# Patient Record
Sex: MALE | Race: WHITE | Employment: FULL TIME | URBAN - METROPOLITAN AREA
[De-identification: names, ages, dates, MRNs, and addresses within clinical notes are randomized per-mention and may not be internally consistent; named-entity substitution may affect disease eponyms.]

---

## 2023-08-04 ENCOUNTER — APPOINTMENT (OUTPATIENT)
Dept: GENERAL RADIOLOGY | Age: 58
End: 2023-08-04

## 2023-08-04 ENCOUNTER — APPOINTMENT (OUTPATIENT)
Dept: GENERAL RADIOLOGY | Age: 58
DRG: 199 | End: 2023-08-04

## 2023-08-04 ENCOUNTER — HOSPITAL ENCOUNTER (EMERGENCY)
Age: 58
Discharge: ANOTHER HOSPITAL | End: 2023-08-04
Attending: EMERGENCY MEDICINE

## 2023-08-04 ENCOUNTER — HOSPITAL ENCOUNTER (INPATIENT)
Age: 58
LOS: 2 days | Discharge: HOME | DRG: 199 | End: 2023-08-06

## 2023-08-04 ENCOUNTER — APPOINTMENT (OUTPATIENT)
Dept: CT IMAGING | Age: 58
End: 2023-08-04

## 2023-08-04 VITALS
BODY MASS INDEX: 18.19 KG/M2 | TEMPERATURE: 97.9 F | HEIGHT: 68 IN | DIASTOLIC BLOOD PRESSURE: 112 MMHG | RESPIRATION RATE: 16 BRPM | WEIGHT: 120 LBS | HEART RATE: 88 BPM | SYSTOLIC BLOOD PRESSURE: 168 MMHG | OXYGEN SATURATION: 97 %

## 2023-08-04 DIAGNOSIS — F10.10 ALCOHOL ABUSE: Chronic | ICD-10-CM

## 2023-08-04 DIAGNOSIS — S27.0XXA TRAUMATIC PNEUMOTHORAX, INITIAL ENCOUNTER: ICD-10-CM

## 2023-08-04 DIAGNOSIS — S22.41XA CLOSED TRAUMATIC FRACTURE OF RIBS OF RIGHT SIDE WITH PNEUMOTHORAX: ICD-10-CM

## 2023-08-04 DIAGNOSIS — J18.9 COMMUNITY ACQUIRED PNEUMONIA OF RIGHT MIDDLE LOBE OF LUNG: ICD-10-CM

## 2023-08-04 DIAGNOSIS — J43.9 PULMONARY EMPHYSEMA, UNSPECIFIED EMPHYSEMA TYPE (HCC): Primary | Chronic | ICD-10-CM

## 2023-08-04 DIAGNOSIS — J96.01 ACUTE RESPIRATORY FAILURE WITH HYPOXEMIA (HCC): Primary | ICD-10-CM

## 2023-08-04 DIAGNOSIS — S27.0XXA CLOSED TRAUMATIC FRACTURE OF RIBS OF RIGHT SIDE WITH PNEUMOTHORAX: ICD-10-CM

## 2023-08-04 PROBLEM — J44.9 COPD (CHRONIC OBSTRUCTIVE PULMONARY DISEASE) (HCC): Status: ACTIVE | Noted: 2023-08-04

## 2023-08-04 PROBLEM — F17.200 SMOKER: Status: ACTIVE | Noted: 2023-08-04

## 2023-08-04 LAB
ALBUMIN SERPL-MCNC: 4.6 G/DL (ref 3.5–5)
ALBUMIN/GLOB SERPL: 1.6 (ref 0.4–1.6)
ALP SERPL-CCNC: 65 U/L (ref 45–117)
ALT SERPL-CCNC: 16 U/L (ref 13–61)
ANION GAP SERPL CALC-SCNC: 11 MMOL/L (ref 2–11)
AST SERPL-CCNC: 23 U/L (ref 15–37)
BASOPHILS # BLD: 0 K/UL (ref 0–0.2)
BASOPHILS NFR BLD: 0 % (ref 0–2)
BILIRUB SERPL-MCNC: 0.2 MG/DL (ref 0.2–1.1)
BUN SERPL-MCNC: 8 MG/DL (ref 6–23)
CALCIUM SERPL-MCNC: 8.9 MG/DL (ref 8.3–10.4)
CHLORIDE SERPL-SCNC: 96 MMOL/L (ref 98–107)
CO2 SERPL-SCNC: 27 MMOL/L (ref 21–32)
CREAT SERPL-MCNC: 0.57 MG/DL (ref 0.8–1.5)
DIFFERENTIAL METHOD BLD: ABNORMAL
EKG ATRIAL RATE: 72 BPM
EKG DIAGNOSIS: NORMAL
EKG P AXIS: 91 DEGREES
EKG P-R INTERVAL: 128 MS
EKG Q-T INTERVAL: 407 MS
EKG QRS DURATION: 87 MS
EKG QTC CALCULATION (BAZETT): 449 MS
EKG R AXIS: 89 DEGREES
EKG T AXIS: 82 DEGREES
EKG VENTRICULAR RATE: 73 BPM
EOSINOPHIL # BLD: 0 K/UL (ref 0–0.8)
EOSINOPHIL NFR BLD: 0 % (ref 0.5–7.8)
ERYTHROCYTE [DISTWIDTH] IN BLOOD BY AUTOMATED COUNT: 12.1 % (ref 11.9–14.6)
GLOBULIN SER CALC-MCNC: 2.8 G/DL (ref 2.8–4.5)
GLUCOSE SERPL-MCNC: 130 MG/DL (ref 65–100)
HCT VFR BLD AUTO: 46.3 % (ref 41.1–50.3)
HGB BLD-MCNC: 16.2 G/DL (ref 13.6–17.2)
IMM GRANULOCYTES # BLD AUTO: 0.1 K/UL (ref 0–0.5)
IMM GRANULOCYTES NFR BLD AUTO: 0 % (ref 0–5)
LYMPHOCYTES # BLD: 1.3 K/UL (ref 0.5–4.6)
LYMPHOCYTES NFR BLD: 9 % (ref 13–44)
MAGNESIUM SERPL-MCNC: 1.7 MG/DL (ref 1.2–2.6)
MAGNESIUM SERPL-MCNC: 2 MG/DL (ref 1.8–2.4)
MCH RBC QN AUTO: 33.6 PG (ref 26.1–32.9)
MCHC RBC AUTO-ENTMCNC: 35 G/DL (ref 31.4–35)
MCV RBC AUTO: 96.1 FL (ref 82–102)
MONOCYTES # BLD: 0.6 K/UL (ref 0.1–1.3)
MONOCYTES NFR BLD: 4 % (ref 4–12)
NEUTS SEG # BLD: 13.8 K/UL (ref 1.7–8.2)
NEUTS SEG NFR BLD: 87 % (ref 43–78)
NRBC # BLD: 0 K/UL (ref 0–0.2)
NT PRO BNP: 151 PG/ML (ref 0–450)
PLATELET # BLD AUTO: 369 K/UL (ref 150–450)
PMV BLD AUTO: 8 FL (ref 9.4–12.3)
POTASSIUM SERPL-SCNC: 4.6 MMOL/L (ref 3.5–5.1)
PROT SERPL-MCNC: 7.4 G/DL (ref 6.4–8.2)
RBC # BLD AUTO: 4.82 M/UL (ref 4.23–5.6)
SODIUM SERPL-SCNC: 134 MMOL/L (ref 133–143)
TROPONIN T SERPL HS-MCNC: <6 NG/L (ref 0–22)
WBC # BLD AUTO: 15.8 K/UL (ref 4.3–11.1)

## 2023-08-04 PROCEDURE — 2580000003 HC RX 258

## 2023-08-04 PROCEDURE — 6360000002 HC RX W HCPCS

## 2023-08-04 PROCEDURE — 96375 TX/PRO/DX INJ NEW DRUG ADDON: CPT

## 2023-08-04 PROCEDURE — 71260 CT THORAX DX C+: CPT

## 2023-08-04 PROCEDURE — 6360000002 HC RX W HCPCS: Performed by: NURSE PRACTITIONER

## 2023-08-04 PROCEDURE — 99223 1ST HOSP IP/OBS HIGH 75: CPT | Performed by: INTERNAL MEDICINE

## 2023-08-04 PROCEDURE — 2700000000 HC OXYGEN THERAPY PER DAY

## 2023-08-04 PROCEDURE — 1100000000 HC RM PRIVATE

## 2023-08-04 PROCEDURE — 6360000002 HC RX W HCPCS: Performed by: EMERGENCY MEDICINE

## 2023-08-04 PROCEDURE — 94761 N-INVAS EAR/PLS OXIMETRY MLT: CPT

## 2023-08-04 PROCEDURE — 6370000000 HC RX 637 (ALT 250 FOR IP): Performed by: HOSPITALIST

## 2023-08-04 PROCEDURE — 94640 AIRWAY INHALATION TREATMENT: CPT

## 2023-08-04 PROCEDURE — 36415 COLL VENOUS BLD VENIPUNCTURE: CPT

## 2023-08-04 PROCEDURE — 80053 COMPREHEN METABOLIC PANEL: CPT

## 2023-08-04 PROCEDURE — 85025 COMPLETE CBC W/AUTO DIFF WBC: CPT

## 2023-08-04 PROCEDURE — 83880 ASSAY OF NATRIURETIC PEPTIDE: CPT

## 2023-08-04 PROCEDURE — 6370000000 HC RX 637 (ALT 250 FOR IP)

## 2023-08-04 PROCEDURE — 6370000000 HC RX 637 (ALT 250 FOR IP): Performed by: INTERNAL MEDICINE

## 2023-08-04 PROCEDURE — 96374 THER/PROPH/DIAG INJ IV PUSH: CPT

## 2023-08-04 PROCEDURE — 71045 X-RAY EXAM CHEST 1 VIEW: CPT

## 2023-08-04 PROCEDURE — 83735 ASSAY OF MAGNESIUM: CPT

## 2023-08-04 PROCEDURE — 6370000000 HC RX 637 (ALT 250 FOR IP): Performed by: EMERGENCY MEDICINE

## 2023-08-04 PROCEDURE — 99285 EMERGENCY DEPT VISIT HI MDM: CPT

## 2023-08-04 PROCEDURE — 93005 ELECTROCARDIOGRAM TRACING: CPT | Performed by: EMERGENCY MEDICINE

## 2023-08-04 PROCEDURE — 93010 ELECTROCARDIOGRAM REPORT: CPT | Performed by: INTERNAL MEDICINE

## 2023-08-04 PROCEDURE — 99223 1ST HOSP IP/OBS HIGH 75: CPT

## 2023-08-04 PROCEDURE — 6360000002 HC RX W HCPCS: Performed by: HOSPITALIST

## 2023-08-04 PROCEDURE — 2580000003 HC RX 258: Performed by: NURSE PRACTITIONER

## 2023-08-04 PROCEDURE — 6360000004 HC RX CONTRAST MEDICATION: Performed by: EMERGENCY MEDICINE

## 2023-08-04 PROCEDURE — 87040 BLOOD CULTURE FOR BACTERIA: CPT

## 2023-08-04 PROCEDURE — 84484 ASSAY OF TROPONIN QUANT: CPT

## 2023-08-04 PROCEDURE — 96365 THER/PROPH/DIAG IV INF INIT: CPT

## 2023-08-04 PROCEDURE — 2580000003 HC RX 258: Performed by: EMERGENCY MEDICINE

## 2023-08-04 RX ORDER — AMLODIPINE BESYLATE 5 MG/1
5 TABLET ORAL DAILY
Status: DISCONTINUED | OUTPATIENT
Start: 2023-08-04 | End: 2023-08-04

## 2023-08-04 RX ORDER — SODIUM CHLORIDE 0.9 % (FLUSH) 0.9 %
10 SYRINGE (ML) INJECTION
Status: DISCONTINUED | OUTPATIENT
Start: 2023-08-04 | End: 2023-08-04 | Stop reason: HOSPADM

## 2023-08-04 RX ORDER — ONDANSETRON 2 MG/ML
4 INJECTION INTRAMUSCULAR; INTRAVENOUS
Status: COMPLETED | OUTPATIENT
Start: 2023-08-04 | End: 2023-08-04

## 2023-08-04 RX ORDER — MORPHINE SULFATE 4 MG/ML
4 INJECTION, SOLUTION INTRAMUSCULAR; INTRAVENOUS
Status: COMPLETED | OUTPATIENT
Start: 2023-08-04 | End: 2023-08-04

## 2023-08-04 RX ORDER — ALBUTEROL SULFATE 2.5 MG/3ML
2.5 SOLUTION RESPIRATORY (INHALATION)
Status: DISCONTINUED | OUTPATIENT
Start: 2023-08-04 | End: 2023-08-06 | Stop reason: HOSPADM

## 2023-08-04 RX ORDER — LORAZEPAM 2 MG/ML
2 INJECTION INTRAMUSCULAR
Status: DISCONTINUED | OUTPATIENT
Start: 2023-08-04 | End: 2023-08-06 | Stop reason: HOSPADM

## 2023-08-04 RX ORDER — MORPHINE SULFATE 2 MG/ML
2 INJECTION, SOLUTION INTRAMUSCULAR; INTRAVENOUS EVERY 6 HOURS PRN
Status: DISCONTINUED | OUTPATIENT
Start: 2023-08-04 | End: 2023-08-04

## 2023-08-04 RX ORDER — ENOXAPARIN SODIUM 100 MG/ML
40 INJECTION SUBCUTANEOUS DAILY
Status: DISCONTINUED | OUTPATIENT
Start: 2023-08-04 | End: 2023-08-05

## 2023-08-04 RX ORDER — ONDANSETRON 2 MG/ML
4 INJECTION INTRAMUSCULAR; INTRAVENOUS EVERY 6 HOURS PRN
Status: DISCONTINUED | OUTPATIENT
Start: 2023-08-04 | End: 2023-08-06 | Stop reason: HOSPADM

## 2023-08-04 RX ORDER — LORAZEPAM 1 MG/1
2 TABLET ORAL
Status: DISCONTINUED | OUTPATIENT
Start: 2023-08-04 | End: 2023-08-06 | Stop reason: HOSPADM

## 2023-08-04 RX ORDER — SODIUM CHLORIDE 0.9 % (FLUSH) 0.9 %
5-40 SYRINGE (ML) INJECTION EVERY 12 HOURS SCHEDULED
Status: DISCONTINUED | OUTPATIENT
Start: 2023-08-04 | End: 2023-08-06 | Stop reason: HOSPADM

## 2023-08-04 RX ORDER — LIDOCAINE 4 G/G
1 PATCH TOPICAL DAILY
Status: DISCONTINUED | OUTPATIENT
Start: 2023-08-04 | End: 2023-08-06 | Stop reason: HOSPADM

## 2023-08-04 RX ORDER — 0.9 % SODIUM CHLORIDE 0.9 %
100 INTRAVENOUS SOLUTION INTRAVENOUS
Status: DISCONTINUED | OUTPATIENT
Start: 2023-08-04 | End: 2023-08-04 | Stop reason: HOSPADM

## 2023-08-04 RX ORDER — MORPHINE SULFATE 2 MG/ML
2 INJECTION, SOLUTION INTRAMUSCULAR; INTRAVENOUS EVERY 4 HOURS PRN
Status: DISCONTINUED | OUTPATIENT
Start: 2023-08-04 | End: 2023-08-04

## 2023-08-04 RX ORDER — LORAZEPAM 2 MG/ML
1 INJECTION INTRAMUSCULAR
Status: DISCONTINUED | OUTPATIENT
Start: 2023-08-04 | End: 2023-08-06 | Stop reason: HOSPADM

## 2023-08-04 RX ORDER — LORAZEPAM 2 MG/ML
3 INJECTION INTRAMUSCULAR
Status: DISCONTINUED | OUTPATIENT
Start: 2023-08-04 | End: 2023-08-06 | Stop reason: HOSPADM

## 2023-08-04 RX ORDER — SODIUM CHLORIDE 9 MG/ML
INJECTION, SOLUTION INTRAVENOUS PRN
Status: DISCONTINUED | OUTPATIENT
Start: 2023-08-04 | End: 2023-08-06 | Stop reason: HOSPADM

## 2023-08-04 RX ORDER — SODIUM CHLORIDE 0.9 % (FLUSH) 0.9 %
5-40 SYRINGE (ML) INJECTION PRN
Status: DISCONTINUED | OUTPATIENT
Start: 2023-08-04 | End: 2023-08-06 | Stop reason: HOSPADM

## 2023-08-04 RX ORDER — LORAZEPAM 1 MG/1
1 TABLET ORAL
Status: DISCONTINUED | OUTPATIENT
Start: 2023-08-04 | End: 2023-08-06 | Stop reason: HOSPADM

## 2023-08-04 RX ORDER — IPRATROPIUM BROMIDE AND ALBUTEROL SULFATE 2.5; .5 MG/3ML; MG/3ML
1 SOLUTION RESPIRATORY (INHALATION)
Status: COMPLETED | OUTPATIENT
Start: 2023-08-04 | End: 2023-08-04

## 2023-08-04 RX ORDER — MORPHINE SULFATE 2 MG/ML
2 INJECTION, SOLUTION INTRAMUSCULAR; INTRAVENOUS EVERY 4 HOURS PRN
Status: DISCONTINUED | OUTPATIENT
Start: 2023-08-04 | End: 2023-08-06 | Stop reason: HOSPADM

## 2023-08-04 RX ORDER — POLYETHYLENE GLYCOL 3350 17 G/17G
17 POWDER, FOR SOLUTION ORAL DAILY PRN
Status: DISCONTINUED | OUTPATIENT
Start: 2023-08-04 | End: 2023-08-06 | Stop reason: HOSPADM

## 2023-08-04 RX ORDER — KETOROLAC TROMETHAMINE 30 MG/ML
30 INJECTION, SOLUTION INTRAMUSCULAR; INTRAVENOUS
Status: COMPLETED | OUTPATIENT
Start: 2023-08-04 | End: 2023-08-04

## 2023-08-04 RX ORDER — LORAZEPAM 2 MG/ML
4 INJECTION INTRAMUSCULAR
Status: DISCONTINUED | OUTPATIENT
Start: 2023-08-04 | End: 2023-08-06 | Stop reason: HOSPADM

## 2023-08-04 RX ORDER — ONDANSETRON 4 MG/1
4 TABLET, ORALLY DISINTEGRATING ORAL EVERY 8 HOURS PRN
Status: DISCONTINUED | OUTPATIENT
Start: 2023-08-04 | End: 2023-08-06 | Stop reason: HOSPADM

## 2023-08-04 RX ORDER — ACETAMINOPHEN 650 MG/1
650 SUPPOSITORY RECTAL EVERY 6 HOURS PRN
Status: DISCONTINUED | OUTPATIENT
Start: 2023-08-04 | End: 2023-08-06 | Stop reason: HOSPADM

## 2023-08-04 RX ORDER — OXYCODONE HYDROCHLORIDE 5 MG/1
5 TABLET ORAL EVERY 4 HOURS PRN
Status: DISCONTINUED | OUTPATIENT
Start: 2023-08-04 | End: 2023-08-06 | Stop reason: HOSPADM

## 2023-08-04 RX ORDER — AMLODIPINE BESYLATE 10 MG/1
10 TABLET ORAL DAILY
Status: DISCONTINUED | OUTPATIENT
Start: 2023-08-05 | End: 2023-08-06 | Stop reason: HOSPADM

## 2023-08-04 RX ORDER — THIAMINE HYDROCHLORIDE 100 MG/ML
100 INJECTION, SOLUTION INTRAMUSCULAR; INTRAVENOUS ONCE
Status: COMPLETED | OUTPATIENT
Start: 2023-08-04 | End: 2023-08-04

## 2023-08-04 RX ORDER — LORAZEPAM 1 MG/1
3 TABLET ORAL
Status: DISCONTINUED | OUTPATIENT
Start: 2023-08-04 | End: 2023-08-06 | Stop reason: HOSPADM

## 2023-08-04 RX ORDER — AMLODIPINE BESYLATE 5 MG/1
5 TABLET ORAL ONCE
Status: COMPLETED | OUTPATIENT
Start: 2023-08-04 | End: 2023-08-04

## 2023-08-04 RX ORDER — ACETAMINOPHEN 500 MG
500 TABLET ORAL EVERY 6 HOURS SCHEDULED
Status: COMPLETED | OUTPATIENT
Start: 2023-08-04 | End: 2023-08-06

## 2023-08-04 RX ORDER — GUAIFENESIN 600 MG/1
600 TABLET, EXTENDED RELEASE ORAL EVERY 6 HOURS PRN
Status: DISCONTINUED | OUTPATIENT
Start: 2023-08-04 | End: 2023-08-06 | Stop reason: HOSPADM

## 2023-08-04 RX ORDER — LORAZEPAM 1 MG/1
4 TABLET ORAL
Status: DISCONTINUED | OUTPATIENT
Start: 2023-08-04 | End: 2023-08-06 | Stop reason: HOSPADM

## 2023-08-04 RX ORDER — LANOLIN ALCOHOL/MO/W.PET/CERES
100 CREAM (GRAM) TOPICAL DAILY
Status: DISCONTINUED | OUTPATIENT
Start: 2023-08-05 | End: 2023-08-06 | Stop reason: HOSPADM

## 2023-08-04 RX ORDER — ALBUTEROL SULFATE 90 UG/1
2 AEROSOL, METERED RESPIRATORY (INHALATION) EVERY 4 HOURS PRN
Status: DISCONTINUED | OUTPATIENT
Start: 2023-08-04 | End: 2023-08-06 | Stop reason: HOSPADM

## 2023-08-04 RX ORDER — HYDRALAZINE HYDROCHLORIDE 20 MG/ML
10 INJECTION INTRAMUSCULAR; INTRAVENOUS EVERY 6 HOURS PRN
Status: DISCONTINUED | OUTPATIENT
Start: 2023-08-04 | End: 2023-08-06 | Stop reason: HOSPADM

## 2023-08-04 RX ORDER — ACETAMINOPHEN 325 MG/1
650 TABLET ORAL EVERY 6 HOURS PRN
Status: DISCONTINUED | OUTPATIENT
Start: 2023-08-04 | End: 2023-08-06 | Stop reason: HOSPADM

## 2023-08-04 RX ORDER — LISINOPRIL 20 MG/1
20 TABLET ORAL DAILY
Status: DISCONTINUED | OUTPATIENT
Start: 2023-08-04 | End: 2023-08-06 | Stop reason: HOSPADM

## 2023-08-04 RX ORDER — HYDROCODONE BITARTRATE AND HOMATROPINE METHYLBROMIDE ORAL SOLUTION 5; 1.5 MG/5ML; MG/5ML
5 LIQUID ORAL EVERY 4 HOURS PRN
Status: DISCONTINUED | OUTPATIENT
Start: 2023-08-04 | End: 2023-08-06 | Stop reason: HOSPADM

## 2023-08-04 RX ORDER — LORAZEPAM 1 MG/1
1 TABLET ORAL EVERY 4 HOURS PRN
Status: DISCONTINUED | OUTPATIENT
Start: 2023-08-04 | End: 2023-08-04

## 2023-08-04 RX ADMIN — HYDRALAZINE HYDROCHLORIDE 10 MG: 20 INJECTION INTRAMUSCULAR; INTRAVENOUS at 19:50

## 2023-08-04 RX ADMIN — ALBUTEROL SULFATE 2 PUFF: 90 AEROSOL, METERED RESPIRATORY (INHALATION) at 12:43

## 2023-08-04 RX ADMIN — AZITHROMYCIN MONOHYDRATE 500 MG: 500 INJECTION, POWDER, LYOPHILIZED, FOR SOLUTION INTRAVENOUS at 05:18

## 2023-08-04 RX ADMIN — LISINOPRIL 20 MG: 20 TABLET ORAL at 15:26

## 2023-08-04 RX ADMIN — IPRATROPIUM BROMIDE AND ALBUTEROL SULFATE 1 DOSE: .5; 2.5 SOLUTION RESPIRATORY (INHALATION) at 04:03

## 2023-08-04 RX ADMIN — MORPHINE SULFATE 2 MG: 2 INJECTION, SOLUTION INTRAMUSCULAR; INTRAVENOUS at 20:56

## 2023-08-04 RX ADMIN — AMLODIPINE BESYLATE 5 MG: 5 TABLET ORAL at 13:35

## 2023-08-04 RX ADMIN — ACETAMINOPHEN 500 MG: 500 TABLET, FILM COATED ORAL at 16:50

## 2023-08-04 RX ADMIN — ALBUTEROL SULFATE 2.5 MG: 2.5 SOLUTION RESPIRATORY (INHALATION) at 07:32

## 2023-08-04 RX ADMIN — SODIUM CHLORIDE, PRESERVATIVE FREE 10 ML: 5 INJECTION INTRAVENOUS at 09:45

## 2023-08-04 RX ADMIN — ALBUTEROL SULFATE 2.5 MG: 2.5 SOLUTION RESPIRATORY (INHALATION) at 19:45

## 2023-08-04 RX ADMIN — ACETAMINOPHEN 500 MG: 500 TABLET, FILM COATED ORAL at 22:59

## 2023-08-04 RX ADMIN — OXYCODONE HYDROCHLORIDE 5 MG: 5 TABLET ORAL at 13:35

## 2023-08-04 RX ADMIN — LORAZEPAM 1 MG: 2 INJECTION INTRAMUSCULAR; INTRAVENOUS at 22:52

## 2023-08-04 RX ADMIN — THIAMINE HYDROCHLORIDE 100 MG: 100 INJECTION, SOLUTION INTRAMUSCULAR; INTRAVENOUS at 04:09

## 2023-08-04 RX ADMIN — MORPHINE SULFATE 2 MG: 2 INJECTION, SOLUTION INTRAMUSCULAR; INTRAVENOUS at 16:50

## 2023-08-04 RX ADMIN — SODIUM CHLORIDE, PRESERVATIVE FREE 10 ML: 5 INJECTION INTRAVENOUS at 22:36

## 2023-08-04 RX ADMIN — LORAZEPAM 1 MG: 1 TABLET ORAL at 20:00

## 2023-08-04 RX ADMIN — OXYCODONE HYDROCHLORIDE 5 MG: 5 TABLET ORAL at 19:49

## 2023-08-04 RX ADMIN — AMLODIPINE BESYLATE 5 MG: 5 TABLET ORAL at 09:44

## 2023-08-04 RX ADMIN — KETOROLAC TROMETHAMINE 30 MG: 30 INJECTION, SOLUTION INTRAMUSCULAR; INTRAVENOUS at 04:07

## 2023-08-04 RX ADMIN — MORPHINE SULFATE 4 MG: 4 INJECTION, SOLUTION INTRAMUSCULAR; INTRAVENOUS at 04:04

## 2023-08-04 RX ADMIN — IOPAMIDOL 100 ML: 755 INJECTION, SOLUTION INTRAVENOUS at 04:49

## 2023-08-04 RX ADMIN — SODIUM CHLORIDE, PRESERVATIVE FREE 10 ML: 5 INJECTION INTRAVENOUS at 22:37

## 2023-08-04 RX ADMIN — ACETAMINOPHEN 500 MG: 500 TABLET, FILM COATED ORAL at 09:44

## 2023-08-04 RX ADMIN — ALBUTEROL SULFATE 2.5 MG: 2.5 SOLUTION RESPIRATORY (INHALATION) at 11:14

## 2023-08-04 RX ADMIN — ONDANSETRON 4 MG: 2 INJECTION INTRAMUSCULAR; INTRAVENOUS at 04:05

## 2023-08-04 RX ADMIN — ENOXAPARIN SODIUM 40 MG: 100 INJECTION SUBCUTANEOUS at 09:30

## 2023-08-04 RX ADMIN — ALBUTEROL SULFATE 2.5 MG: 2.5 SOLUTION RESPIRATORY (INHALATION) at 15:42

## 2023-08-04 RX ADMIN — ACETAMINOPHEN 500 MG: 500 TABLET, FILM COATED ORAL at 13:35

## 2023-08-04 RX ADMIN — GUAIFENESIN 600 MG: 600 TABLET ORAL at 15:45

## 2023-08-04 RX ADMIN — CEFTRIAXONE 1000 MG: 1 INJECTION, POWDER, FOR SOLUTION INTRAMUSCULAR; INTRAVENOUS at 05:06

## 2023-08-04 ASSESSMENT — ENCOUNTER SYMPTOMS
BACK PAIN: 1
VOMITING: 0
SHORTNESS OF BREATH: 1
ABDOMINAL PAIN: 0
NAUSEA: 0
EYE PAIN: 0
COLOR CHANGE: 0
EYE REDNESS: 0
FACIAL SWELLING: 0
STRIDOR: 0

## 2023-08-04 ASSESSMENT — PAIN DESCRIPTION - LOCATION
LOCATION: BACK;CHEST
LOCATION: CHEST;BACK
LOCATION: CHEST;BACK

## 2023-08-04 ASSESSMENT — PAIN SCALES - GENERAL
PAINLEVEL_OUTOF10: 6
PAINLEVEL_OUTOF10: 10
PAINLEVEL_OUTOF10: 0
PAINLEVEL_OUTOF10: 10
PAINLEVEL_OUTOF10: 0
PAINLEVEL_OUTOF10: 7
PAINLEVEL_OUTOF10: 10
PAINLEVEL_OUTOF10: 6

## 2023-08-04 ASSESSMENT — PAIN DESCRIPTION - DESCRIPTORS: DESCRIPTORS: TIGHTNESS

## 2023-08-04 ASSESSMENT — PAIN SCALES - WONG BAKER
WONGBAKER_NUMERICALRESPONSE: 0

## 2023-08-04 ASSESSMENT — PAIN DESCRIPTION - ORIENTATION: ORIENTATION: RIGHT

## 2023-08-04 ASSESSMENT — LIFESTYLE VARIABLES
HOW MANY STANDARD DRINKS CONTAINING ALCOHOL DO YOU HAVE ON A TYPICAL DAY: 5 OR 6
HOW OFTEN DO YOU HAVE A DRINK CONTAINING ALCOHOL: 4 OR MORE TIMES A WEEK

## 2023-08-04 ASSESSMENT — PAIN - FUNCTIONAL ASSESSMENT: PAIN_FUNCTIONAL_ASSESSMENT: 0-10

## 2023-08-04 NOTE — ED TRIAGE NOTES
Pt to the ED from home with daughter with c/o of sudden onset of SOB and right back/side pain after a fall about 2-3 hours prior to arrival to the ED. Pt states that he was drinking, (daily drinking about 5-6 beers daily) and he fell going up some stairs appx 3-4 . Pt states that he is not able to take a deep breath in and feels like he cannot breathe. O2 was 88 on room air on arrival to the ED. Oxygen was applied, o2 increased to 93 on 2 liter. MD notified, MD to bedside. Protocols started.

## 2023-08-04 NOTE — ED PROVIDER NOTES
Emergency Department Provider Note       PCP: No primary care provider on file. Age: 60 y.o. Sex: male     DISPOSITION       No diagnosis found. Medical Decision Making     Complexity of Problems Addressed:  1 or more acute illnesses that pose a threat to life or bodily function. Data Reviewed and Analyzed:  Category 1:   I independently ordered and reviewed each unique test.  Category 2:   I independently ordered and interpreted the ED EKG in the absence of a Cardiologist.    Rate: 73  EKG Interpretation: EKG Interpretation: sinus rhythm, no evidence of arrhythmia and non-specific EKG  ST Segments: Nonspecific ST segments - NO STEMI  I interpreted the X-rays small loculated lateral pneumothorax seen on chest x-ray, initially noticed by the radiologist.  I interpreted the CT Scan of the chest with moderate pneumothorax with isolated 10th rib fracture posteriorly to my read. Category 3: Discussion of management or test interpretation. 65-year-old gentleman sustained a fall with right chest wall trauma comes in with low oxygen saturations and significant musculoskeletal chest pain. He has rib fracture and pneumothorax patient is maintaining his sats well on nasal cannula he will be transferred to the ICU Dr. Tali Laurent after having initially queried the hospitalist for admission  The patient was admitted and I have discussed patient management with the admitting provider. The management of this patient was discussed with an external consultant. Risk of Complications and/or Morbidity of Patient Management:  Prescription drug management performed. Parental controlled substances given in the ED. Patient was discharged risks and benefits of hospitalization were considered. Chronic medical problems impacting care include alcohol dependence. Shared medical decision making was utilized in creating the patients health plan today.          Is this patient to be included in the SEP-1 core measure due

## 2023-08-04 NOTE — H&P
volume reconstructions were generated. CT  dose lowering techniques were used, to include: automated exposure control,  adjustment for patient size, and or use of iterative reconstruction. FINDINGS:    CTA: No pulmonary arterial filling defect. Heart size normal without pericardial  effusion. Mediastinal vessels normal in caliber and patency. Mediastinum: No mediastinal or hilar lymphadenopathy. Trachea and central  airways are patent. Thyroid is normal. Esophagus is normal.    Lungs and pleura: Bullous emphysematous changes seen in the lung apices  bilaterally. An anterior right sided pneumothorax is present without tension. No  pleural effusion. Upper abdomen: No acute abnormality. Body wall: No acute abnormality. Bones: A mildly displaced right posterior 11th rib fracture is present. Impression  1. Right anterior pneumothorax without tension. 2. Right posterior 11th rib fracture. 3. Advanced centrilobular emphysema with biapical bullous change. Jerrell Powell M.D.  8/4/2023 5:22:00 AM    LAB:  Recent Labs     08/04/23 0357   WBC 15.8*   HGB 16.2   HCT 46.3        Recent Labs     08/04/23 0357      K 4.6   CL 96*   CO2 27   BUN 8   CREATININE 0.57*   MG 1.7   BILITOT 0.2   AST 23   ALT 16   ALKPHOS 65     Recent Labs     08/04/23 0357   NTPROBNP 151     No results for input(s): PHAPOC, RSU9IHQX, JB9PFMZ, DJO7OQO, BE in the last 72 hours. Microbiology:   No results for input(s): CULTURE in the last 72 hours. Assessment and Plan:  (Medical Decision Making)   Impression: 79-year-old male longtime smoker fell down several steps fracturing 11th rib on right side causing small traumatic pneumothorax.     Principal Problem:    Closed traumatic fracture of ribs of right side with pneumothorax  Plan: Pain control pulmonary toilet serial exams for changes  Active Problems:    Alcohol abuse  Plan: Preventative treatment with benzodiazepines as needed    Smoker  Plan: Counseling on

## 2023-08-04 NOTE — ACP (ADVANCE CARE PLANNING)
Advance Care Planning     Advance Care Planning Activator (Inpatient)  Conversation Note      Date of ACP Conversation: 2023         ACP Activator: Emilee Waters RN    {When Decision Maker makes decisions on behalf of the incapacitated patient: Decision Maker is asked to consider and make decisions based on patient values, known preferences, or best interests. Health Care Decision Maker: Bekah LW/HCPOA. Pt not . States he has 3 adult children and one . Understands that all children are legal NOK unless document presented or completed. Encouraged HCPOA with . Current Designated Health Care Decision Maker: Emergency contact -Corey [place] -[phone]    Click here to complete Healthcare Decision Makers including section of the Healthcare Decision Maker Relationship (ie \"Primary\")  Today we documented Decision Maker(s) consistent with Legal Next of Kin hierarchy.

## 2023-08-04 NOTE — CARE COORDINATION
Case Management Assessment  Initial Evaluation    Date/Time of Evaluation: 8/4/2023 12:12 PM  Assessment Completed by: Eleonora Castle RN    If patient is discharged prior to next notation, then this note serves as note for discharge by case management. Patient Name: Sophie Salas                   YOB: 1965  Diagnosis: Traumatic fracture of ribs of right side with pneumothorax [S22.41XA, S27. 0XXA]                   Date / Time: 8/4/2023  6:21 AM    Patient Admission Status: Inpatient   Readmission Risk (Low < 19, Mod (19-27), High > 27): Readmission Risk Score: 3.9    Current PCP: Pcp No  PCP verified by CM? (P) No    Chart Reviewed: Yes      History Provided by: (P) Patient  Patient Orientation: (P) Alert and Oriented    Patient Cognition: (P) Alert    Hospitalization in the last 30 days (Readmission):  No    If yes, Readmission Assessment in CM Navigator will be completed.     Advance Directives:      Code Status: Full Code   Patient's Primary Decision Maker is: (P) Legal Next of Kin      Discharge Planning:    Patient lives with: (P) Alone (Children check on pt, he  texts them everymorning when he gets up) Type of Home: (P) House  Primary Care Giver: (P) Self  Patient Support Systems include: (P) Children, Family Members   Current Financial resources: (P) Other (Comment), Financial Counseling (Elevate PFS following for assist/pt has financial bekah)  Current community resources: (P) None  Current services prior to admission: (P) None            Current DME:  none            Type of Home Care services:  None    ADLS  Prior functional level: (P) Independent in ADLs/IADLs  Current functional level: (P) Assistance with the following:    PT AM-PAC:   /24  OT AM-PAC:   /24    Family can provide assistance at DC: (P) Yes  Would you like Case Management to discuss the discharge plan with any other family members/significant others, and if so, who? (P) Yes  Plans to Return to Present Housing: (P) Yes  Other

## 2023-08-04 NOTE — INTERDISCIPLINARY ROUNDS
Multi-D Rounds/Checklist (leapfrog):  Lines: can any be removed?: None      DVT Prophylaxis: Ordered  Vent: N/A  Nutrition Ordered/appropriate: Ordered  Can antibiotics or other drugs be stopped? N/A   Is Nozin performed:  NA    Inpat Anti-Infectives (From admission, onward)      None          Consults needed: None  A: Is pain control adequate? (has PRNs? Stop drip?) Yes  B: Sedation break and SBT? N/A  C: Is sedation choice appropriate? N/A  D: Delirium/CAM-ICU? No  E: Mobility goals/appropriateness? Yes  F: Family update and plan? Daughter is primary contact and is being updated daily by primary attending and nursing staff.     KAVIN Damian - NP  [place] orders

## 2023-08-04 NOTE — ED NOTES
TRANSFER - OUT REPORT:    Verbal report given to CARLOS Colmenares  on Dominic King  being transferred to [facility] for urgent transfer       Report consisted of patient's Situation, Background, Assessment and   Recommendations(SBAR). Information from the following report(s) Nurse Handoff Report, ED Encounter Summary, ED SBAR, Adult Overview, MAR, Recent Results, and Cardiac Rhythm NSR  was reviewed with the receiving nurse. [place] Fall Assessment:    Presents to emergency department  because of falls (Syncope, seizure, or loss of consciousness): Yes  Age > 79: No  Altered Mental Status, Intoxication with alcohol or substance confusion (Disorientation, impaired judgment, poor safety awaremess, or inability to follow instructions): Yes  Impaired Mobility: Ambulates or transfers with assistive devices or assistance; Unable to ambulate or transer.: No             Lines:   Peripheral IV 08/04/23 Right Antecubital (Active)   Site Assessment Clean, dry & intact 08/04/23 0400   Line Status Normal saline locked; Blood return noted;Brisk blood return;Specimen collected; Flushed 08/04/23 0400   Phlebitis Assessment No symptoms 08/04/23 0400   Infiltration Assessment 0 08/04/23 0400   Alcohol Cap Used No 08/04/23 0400   Dressing Status New dressing applied;Clean, dry & intact 08/04/23 0400   Dressing Type Transparent 08/04/23 0400   Dressing Intervention New 08/04/23 0400       Peripheral IV 08/04/23 Posterior;Right Hand (Active)   Site Assessment Clean, dry & intact 08/04/23 0456   Line Status Blood return noted;Brisk blood return 08/04/23 [facility] pulled back 08/04/23 0456   Phlebitis Assessment No symptoms 08/04/23 0456   Infiltration Assessment 0 08/04/23 0456   Alcohol Cap Used No 08/04/23 0456   Dressing Status New dressing applied;Clean, dry & intact 08/04/23 0456   Dressing Type Transparent 08/04/23 0456   Dressing Intervention New 08/04/23 0456       Peripheral IV 08/04/23 Left;Posterior Hand

## 2023-08-05 ENCOUNTER — APPOINTMENT (OUTPATIENT)
Dept: GENERAL RADIOLOGY | Age: 58
DRG: 199 | End: 2023-08-05

## 2023-08-05 LAB
ALBUMIN SERPL-MCNC: 3.4 G/DL (ref 3.5–5)
ALBUMIN/GLOB SERPL: 1 (ref 0.4–1.6)
ALP SERPL-CCNC: 58 U/L (ref 50–136)
ALT SERPL-CCNC: 22 U/L (ref 12–65)
ANION GAP SERPL CALC-SCNC: 5 MMOL/L (ref 2–11)
AST SERPL-CCNC: 15 U/L (ref 15–37)
BASOPHILS # BLD: 0 K/UL (ref 0–0.2)
BASOPHILS NFR BLD: 0 % (ref 0–2)
BILIRUB SERPL-MCNC: 0.8 MG/DL (ref 0.2–1.1)
BUN SERPL-MCNC: 11 MG/DL (ref 6–23)
CALCIUM SERPL-MCNC: 9.4 MG/DL (ref 8.3–10.4)
CHLORIDE SERPL-SCNC: 98 MMOL/L (ref 101–110)
CO2 SERPL-SCNC: 31 MMOL/L (ref 21–32)
CREAT SERPL-MCNC: 0.6 MG/DL (ref 0.8–1.5)
DIFFERENTIAL METHOD BLD: ABNORMAL
EOSINOPHIL # BLD: 0 K/UL (ref 0–0.8)
EOSINOPHIL NFR BLD: 0 % (ref 0.5–7.8)
ERYTHROCYTE [DISTWIDTH] IN BLOOD BY AUTOMATED COUNT: 12.8 % (ref 11.9–14.6)
GLOBULIN SER CALC-MCNC: 3.5 G/DL (ref 2.8–4.5)
GLUCOSE SERPL-MCNC: 127 MG/DL (ref 65–100)
HCT VFR BLD AUTO: 42 % (ref 41.1–50.3)
HGB BLD-MCNC: 14.2 G/DL (ref 13.6–17.2)
IMM GRANULOCYTES # BLD AUTO: 0.2 K/UL (ref 0–0.5)
IMM GRANULOCYTES NFR BLD AUTO: 1 % (ref 0–5)
LACTATE SERPL-SCNC: 1.9 MMOL/L (ref 0.4–2)
LYMPHOCYTES # BLD: 1 K/UL (ref 0.5–4.6)
LYMPHOCYTES NFR BLD: 4 % (ref 13–44)
MCH RBC QN AUTO: 33 PG (ref 26.1–32.9)
MCHC RBC AUTO-ENTMCNC: 33.8 G/DL (ref 31.4–35)
MCV RBC AUTO: 97.7 FL (ref 82–102)
MONOCYTES # BLD: 1.6 K/UL (ref 0.1–1.3)
MONOCYTES NFR BLD: 7 % (ref 4–12)
NEUTS SEG # BLD: 21.1 K/UL (ref 1.7–8.2)
NEUTS SEG NFR BLD: 88 % (ref 43–78)
NRBC # BLD: 0 K/UL (ref 0–0.2)
PLATELET # BLD AUTO: 303 K/UL (ref 150–450)
PMV BLD AUTO: 8.2 FL (ref 9.4–12.3)
POTASSIUM SERPL-SCNC: 4.5 MMOL/L (ref 3.5–5.1)
PROCALCITONIN SERPL-MCNC: 0.12 NG/ML (ref 0–0.49)
PROT SERPL-MCNC: 6.9 G/DL (ref 6.3–8.2)
RBC # BLD AUTO: 4.3 M/UL (ref 4.23–5.6)
SODIUM SERPL-SCNC: 134 MMOL/L (ref 133–143)
WBC # BLD AUTO: 23.8 K/UL (ref 4.3–11.1)

## 2023-08-05 PROCEDURE — 84145 PROCALCITONIN (PCT): CPT

## 2023-08-05 PROCEDURE — 6360000002 HC RX W HCPCS: Performed by: NURSE PRACTITIONER

## 2023-08-05 PROCEDURE — 6370000000 HC RX 637 (ALT 250 FOR IP): Performed by: HOSPITALIST

## 2023-08-05 PROCEDURE — 94640 AIRWAY INHALATION TREATMENT: CPT

## 2023-08-05 PROCEDURE — 6360000002 HC RX W HCPCS: Performed by: INTERNAL MEDICINE

## 2023-08-05 PROCEDURE — 6360000002 HC RX W HCPCS: Performed by: FAMILY MEDICINE

## 2023-08-05 PROCEDURE — 94760 N-INVAS EAR/PLS OXIMETRY 1: CPT

## 2023-08-05 PROCEDURE — 2580000003 HC RX 258: Performed by: FAMILY MEDICINE

## 2023-08-05 PROCEDURE — 6370000000 HC RX 637 (ALT 250 FOR IP): Performed by: INTERNAL MEDICINE

## 2023-08-05 PROCEDURE — 1100000000 HC RM PRIVATE

## 2023-08-05 PROCEDURE — 94761 N-INVAS EAR/PLS OXIMETRY MLT: CPT

## 2023-08-05 PROCEDURE — 85025 COMPLETE CBC W/AUTO DIFF WBC: CPT

## 2023-08-05 PROCEDURE — 6370000000 HC RX 637 (ALT 250 FOR IP): Performed by: NURSE PRACTITIONER

## 2023-08-05 PROCEDURE — 36415 COLL VENOUS BLD VENIPUNCTURE: CPT

## 2023-08-05 PROCEDURE — 80053 COMPREHEN METABOLIC PANEL: CPT

## 2023-08-05 PROCEDURE — 2580000003 HC RX 258: Performed by: NURSE PRACTITIONER

## 2023-08-05 PROCEDURE — 99232 SBSQ HOSP IP/OBS MODERATE 35: CPT | Performed by: INTERNAL MEDICINE

## 2023-08-05 PROCEDURE — 71045 X-RAY EXAM CHEST 1 VIEW: CPT

## 2023-08-05 PROCEDURE — 6360000002 HC RX W HCPCS

## 2023-08-05 PROCEDURE — 2580000003 HC RX 258

## 2023-08-05 PROCEDURE — 2700000000 HC OXYGEN THERAPY PER DAY

## 2023-08-05 PROCEDURE — 83605 ASSAY OF LACTIC ACID: CPT

## 2023-08-05 PROCEDURE — 6370000000 HC RX 637 (ALT 250 FOR IP): Performed by: FAMILY MEDICINE

## 2023-08-05 RX ORDER — BUDESONIDE 0.5 MG/2ML
0.5 INHALANT ORAL
Status: DISCONTINUED | OUTPATIENT
Start: 2023-08-05 | End: 2023-08-06 | Stop reason: HOSPADM

## 2023-08-05 RX ADMIN — GUAIFENESIN 600 MG: 600 TABLET ORAL at 05:20

## 2023-08-05 RX ADMIN — SODIUM CHLORIDE, PRESERVATIVE FREE 10 ML: 5 INJECTION INTRAVENOUS at 08:40

## 2023-08-05 RX ADMIN — ALBUTEROL SULFATE 2.5 MG: 2.5 SOLUTION RESPIRATORY (INHALATION) at 19:47

## 2023-08-05 RX ADMIN — AMLODIPINE BESYLATE 10 MG: 10 TABLET ORAL at 08:36

## 2023-08-05 RX ADMIN — MORPHINE SULFATE 2 MG: 2 INJECTION, SOLUTION INTRAMUSCULAR; INTRAVENOUS at 19:54

## 2023-08-05 RX ADMIN — ALBUTEROL SULFATE 2.5 MG: 2.5 SOLUTION RESPIRATORY (INHALATION) at 15:40

## 2023-08-05 RX ADMIN — OXYCODONE HYDROCHLORIDE 5 MG: 5 TABLET ORAL at 02:47

## 2023-08-05 RX ADMIN — HYDROCODONE BITARTRATE AND HOMATROPINE METHYLBROMIDE 5 ML: 5; 1.5 SOLUTION ORAL at 05:22

## 2023-08-05 RX ADMIN — CEFTRIAXONE 1000 MG: 1 INJECTION, POWDER, FOR SOLUTION INTRAMUSCULAR; INTRAVENOUS at 04:30

## 2023-08-05 RX ADMIN — ALBUTEROL SULFATE 2.5 MG: 2.5 SOLUTION RESPIRATORY (INHALATION) at 11:28

## 2023-08-05 RX ADMIN — AZITHROMYCIN MONOHYDRATE 500 MG: 500 INJECTION, POWDER, LYOPHILIZED, FOR SOLUTION INTRAVENOUS at 05:11

## 2023-08-05 RX ADMIN — BUDESONIDE 500 MCG: 0.5 SUSPENSION RESPIRATORY (INHALATION) at 19:47

## 2023-08-05 RX ADMIN — ALBUTEROL SULFATE 2.5 MG: 2.5 SOLUTION RESPIRATORY (INHALATION) at 07:47

## 2023-08-05 RX ADMIN — ENOXAPARIN SODIUM 40 MG: 100 INJECTION SUBCUTANEOUS at 08:36

## 2023-08-05 RX ADMIN — SODIUM CHLORIDE, PRESERVATIVE FREE 10 ML: 5 INJECTION INTRAVENOUS at 21:39

## 2023-08-05 RX ADMIN — LISINOPRIL 20 MG: 20 TABLET ORAL at 08:36

## 2023-08-05 RX ADMIN — SODIUM CHLORIDE, PRESERVATIVE FREE 10 ML: 5 INJECTION INTRAVENOUS at 08:45

## 2023-08-05 RX ADMIN — LORAZEPAM 1 MG: 2 INJECTION INTRAMUSCULAR; INTRAVENOUS at 21:15

## 2023-08-05 RX ADMIN — Medication 100 MG: at 08:43

## 2023-08-05 RX ADMIN — OXYCODONE HYDROCHLORIDE 5 MG: 5 TABLET ORAL at 17:03

## 2023-08-05 RX ADMIN — ACETAMINOPHEN 500 MG: 500 TABLET, FILM COATED ORAL at 17:03

## 2023-08-05 RX ADMIN — GUAIFENESIN 600 MG: 600 TABLET ORAL at 19:54

## 2023-08-05 RX ADMIN — ACETAMINOPHEN 500 MG: 500 TABLET, FILM COATED ORAL at 05:12

## 2023-08-05 RX ADMIN — ACETAMINOPHEN 500 MG: 500 TABLET, FILM COATED ORAL at 12:59

## 2023-08-05 RX ADMIN — HYDROCODONE BITARTRATE AND HOMATROPINE METHYLBROMIDE 5 ML: 5; 1.5 SOLUTION ORAL at 21:15

## 2023-08-05 RX ADMIN — OXYCODONE HYDROCHLORIDE 5 MG: 5 TABLET ORAL at 08:43

## 2023-08-05 ASSESSMENT — PAIN DESCRIPTION - DESCRIPTORS
DESCRIPTORS: ACHING

## 2023-08-05 ASSESSMENT — PAIN SCALES - GENERAL
PAINLEVEL_OUTOF10: 5
PAINLEVEL_OUTOF10: 7
PAINLEVEL_OUTOF10: 5
PAINLEVEL_OUTOF10: 3
PAINLEVEL_OUTOF10: 7

## 2023-08-05 ASSESSMENT — PAIN DESCRIPTION - ORIENTATION
ORIENTATION: RIGHT
ORIENTATION: ANTERIOR

## 2023-08-05 ASSESSMENT — PAIN DESCRIPTION - LOCATION
LOCATION: CHEST
LOCATION: CHEST
LOCATION: CHEST;FLANK
LOCATION: BACK;FLANK
LOCATION: CHEST;FLANK

## 2023-08-05 NOTE — DISCHARGE INSTRUCTIONS
[facility]  [address]   [address]   [phone]    The pulmonary office will call you in 1-2 business days to arrange follow up in the pulmonary office. If you have not heard from the office after 2 full business days, please call the office to arrange follow up. DISCHARGE SUMMARY from Nurse    PATIENT INSTRUCTIONS:    After general anesthesia or intravenous sedation, for 24 hours or while taking prescription Narcotics:  Limit your activities  Do not drive and operate hazardous machinery  Do not make important personal or business decisions  Do  not drink alcoholic beverages  If you have not urinated within 8 hours after discharge, please contact your surgeon on call. Report the following to your surgeon:  Excessive pain, swelling, redness or odor of or around the surgical area  Temperature over 100.5  Nausea and vomiting lasting longer than 4 hours or if unable to take medications  Any signs of decreased circulation or nerve impairment to extremity: change in color, persistent  numbness, tingling, coldness or increase pain  Any questions    What to do at Home:  Recommended activity: no heavy lifting, scuba diving,or air travel for 1 month    If you experience any of the following symptoms shortness of breath not relieved by rest, pain not relieved by medication, chest pain or pressure, temperature greater than 101, nausea, vomiting or diarrhea, please follow up with MD or if you feel like it is an emergency come to ER or call 911. *  Please give a list of your current medications to your Primary Care Provider. *  Please update this list whenever your medications are discontinued, doses are      changed, or new medications (including over-the-counter products) are added. *  Please carry medication information at all times in case of emergency situations.     These are general instructions for a healthy lifestyle:    No smoking/ No tobacco products/ Avoid exposure to second hand

## 2023-08-05 NOTE — SIGNIFICANT EVENT
Message from nursing, patient pain, anxiety. Morphine increased to 2 mg IV Q4h prn, repeat CXR ordered. Noted with ETOH history, ETOH protocol ordered.

## 2023-08-06 ENCOUNTER — APPOINTMENT (OUTPATIENT)
Dept: GENERAL RADIOLOGY | Age: 58
DRG: 199 | End: 2023-08-06

## 2023-08-06 VITALS
DIASTOLIC BLOOD PRESSURE: 96 MMHG | TEMPERATURE: 97.9 F | HEART RATE: 93 BPM | RESPIRATION RATE: 18 BRPM | HEIGHT: 68 IN | BODY MASS INDEX: 16.82 KG/M2 | WEIGHT: 111 LBS | OXYGEN SATURATION: 94 % | SYSTOLIC BLOOD PRESSURE: 139 MMHG

## 2023-08-06 PROBLEM — J43.9 EMPHYSEMA LUNG (HCC): Chronic | Status: ACTIVE | Noted: 2023-08-04

## 2023-08-06 PROBLEM — F17.200 SMOKER: Chronic | Status: ACTIVE | Noted: 2023-08-04

## 2023-08-06 PROBLEM — F10.10 ALCOHOL ABUSE: Chronic | Status: ACTIVE | Noted: 2023-08-04

## 2023-08-06 LAB
ANION GAP SERPL CALC-SCNC: 5 MMOL/L (ref 2–11)
BACTERIA SPEC CULT: NORMAL
BACTERIA SPEC CULT: NORMAL
BASOPHILS # BLD: 0 K/UL (ref 0–0.2)
BASOPHILS NFR BLD: 0 % (ref 0–2)
BUN SERPL-MCNC: 8 MG/DL (ref 6–23)
CALCIUM SERPL-MCNC: 9.4 MG/DL (ref 8.3–10.4)
CHLORIDE SERPL-SCNC: 98 MMOL/L (ref 101–110)
CO2 SERPL-SCNC: 30 MMOL/L (ref 21–32)
CREAT SERPL-MCNC: 0.6 MG/DL (ref 0.8–1.5)
DIFFERENTIAL METHOD BLD: ABNORMAL
EOSINOPHIL # BLD: 0 K/UL (ref 0–0.8)
EOSINOPHIL NFR BLD: 0 % (ref 0.5–7.8)
ERYTHROCYTE [DISTWIDTH] IN BLOOD BY AUTOMATED COUNT: 12.7 % (ref 11.9–14.6)
GLUCOSE SERPL-MCNC: 116 MG/DL (ref 65–100)
HCT VFR BLD AUTO: 41.6 % (ref 41.1–50.3)
HGB BLD-MCNC: 14.1 G/DL (ref 13.6–17.2)
IMM GRANULOCYTES # BLD AUTO: 0.1 K/UL (ref 0–0.5)
IMM GRANULOCYTES NFR BLD AUTO: 1 % (ref 0–5)
LYMPHOCYTES # BLD: 1.3 K/UL (ref 0.5–4.6)
LYMPHOCYTES NFR BLD: 8 % (ref 13–44)
MCH RBC QN AUTO: 33 PG (ref 26.1–32.9)
MCHC RBC AUTO-ENTMCNC: 33.9 G/DL (ref 31.4–35)
MCV RBC AUTO: 97.4 FL (ref 82–102)
MONOCYTES # BLD: 1.2 K/UL (ref 0.1–1.3)
MONOCYTES NFR BLD: 7 % (ref 4–12)
NEUTS SEG # BLD: 14 K/UL (ref 1.7–8.2)
NEUTS SEG NFR BLD: 84 % (ref 43–78)
NRBC # BLD: 0 K/UL (ref 0–0.2)
PLATELET # BLD AUTO: 281 K/UL (ref 150–450)
PMV BLD AUTO: 8.5 FL (ref 9.4–12.3)
POTASSIUM SERPL-SCNC: 4 MMOL/L (ref 3.5–5.1)
RBC # BLD AUTO: 4.27 M/UL (ref 4.23–5.6)
SERVICE CMNT-IMP: NORMAL
SERVICE CMNT-IMP: NORMAL
SODIUM SERPL-SCNC: 133 MMOL/L (ref 133–143)
WBC # BLD AUTO: 16.7 K/UL (ref 4.3–11.1)

## 2023-08-06 PROCEDURE — 6370000000 HC RX 637 (ALT 250 FOR IP): Performed by: HOSPITALIST

## 2023-08-06 PROCEDURE — 6360000002 HC RX W HCPCS

## 2023-08-06 PROCEDURE — 6360000002 HC RX W HCPCS: Performed by: NURSE PRACTITIONER

## 2023-08-06 PROCEDURE — 6370000000 HC RX 637 (ALT 250 FOR IP): Performed by: INTERNAL MEDICINE

## 2023-08-06 PROCEDURE — 71045 X-RAY EXAM CHEST 1 VIEW: CPT

## 2023-08-06 PROCEDURE — 36415 COLL VENOUS BLD VENIPUNCTURE: CPT

## 2023-08-06 PROCEDURE — 94761 N-INVAS EAR/PLS OXIMETRY MLT: CPT

## 2023-08-06 PROCEDURE — 6370000000 HC RX 637 (ALT 250 FOR IP): Performed by: FAMILY MEDICINE

## 2023-08-06 PROCEDURE — 97165 OT EVAL LOW COMPLEX 30 MIN: CPT

## 2023-08-06 PROCEDURE — 85025 COMPLETE CBC W/AUTO DIFF WBC: CPT

## 2023-08-06 PROCEDURE — 87205 SMEAR GRAM STAIN: CPT

## 2023-08-06 PROCEDURE — 2700000000 HC OXYGEN THERAPY PER DAY

## 2023-08-06 PROCEDURE — 2580000003 HC RX 258: Performed by: NURSE PRACTITIONER

## 2023-08-06 PROCEDURE — 6360000002 HC RX W HCPCS: Performed by: INTERNAL MEDICINE

## 2023-08-06 PROCEDURE — 99232 SBSQ HOSP IP/OBS MODERATE 35: CPT | Performed by: INTERNAL MEDICINE

## 2023-08-06 PROCEDURE — 94640 AIRWAY INHALATION TREATMENT: CPT

## 2023-08-06 PROCEDURE — 2580000003 HC RX 258: Performed by: FAMILY MEDICINE

## 2023-08-06 PROCEDURE — 97535 SELF CARE MNGMENT TRAINING: CPT

## 2023-08-06 PROCEDURE — 6370000000 HC RX 637 (ALT 250 FOR IP): Performed by: NURSE PRACTITIONER

## 2023-08-06 PROCEDURE — 80048 BASIC METABOLIC PNL TOTAL CA: CPT

## 2023-08-06 PROCEDURE — 87070 CULTURE OTHR SPECIMN AEROBIC: CPT

## 2023-08-06 PROCEDURE — 94760 N-INVAS EAR/PLS OXIMETRY 1: CPT

## 2023-08-06 PROCEDURE — 2580000003 HC RX 258

## 2023-08-06 PROCEDURE — 6360000002 HC RX W HCPCS: Performed by: FAMILY MEDICINE

## 2023-08-06 RX ORDER — LANOLIN ALCOHOL/MO/W.PET/CERES
100 CREAM (GRAM) TOPICAL DAILY
Qty: 30 TABLET | Refills: 3 | Status: SHIPPED | OUTPATIENT
Start: 2023-08-07

## 2023-08-06 RX ORDER — IBUPROFEN 600 MG/1
600 TABLET ORAL
Qty: 120 TABLET | Refills: 3 | Status: SHIPPED | OUTPATIENT
Start: 2023-08-06

## 2023-08-06 RX ORDER — ALBUTEROL SULFATE 90 UG/1
2 AEROSOL, METERED RESPIRATORY (INHALATION) EVERY 4 HOURS PRN
Qty: 18 G | Refills: 3 | Status: SHIPPED | OUTPATIENT
Start: 2023-08-06

## 2023-08-06 RX ORDER — AMLODIPINE BESYLATE 10 MG/1
10 TABLET ORAL DAILY
Qty: 30 TABLET | Refills: 3 | Status: SHIPPED | OUTPATIENT
Start: 2023-08-07

## 2023-08-06 RX ORDER — LISINOPRIL 20 MG/1
20 TABLET ORAL DAILY
Qty: 30 TABLET | Refills: 3 | Status: SHIPPED | OUTPATIENT
Start: 2023-08-07

## 2023-08-06 RX ORDER — LIDOCAINE 4 G/G
1 PATCH TOPICAL DAILY
Qty: 5 PATCH | Refills: 0 | Status: SHIPPED | OUTPATIENT
Start: 2023-08-07

## 2023-08-06 RX ORDER — GUAIFENESIN 600 MG/1
600 TABLET, EXTENDED RELEASE ORAL EVERY 6 HOURS PRN
Qty: 30 TABLET | Refills: 0 | Status: SHIPPED | OUTPATIENT
Start: 2023-08-06

## 2023-08-06 RX ORDER — IBUPROFEN 600 MG/1
600 TABLET ORAL
Status: DISCONTINUED | OUTPATIENT
Start: 2023-08-06 | End: 2023-08-06 | Stop reason: HOSPADM

## 2023-08-06 RX ORDER — AMOXICILLIN AND CLAVULANATE POTASSIUM 875; 125 MG/1; MG/1
1 TABLET, FILM COATED ORAL 2 TIMES DAILY
Qty: 14 TABLET | Refills: 0 | Status: SHIPPED | OUTPATIENT
Start: 2023-08-06 | End: 2023-08-13

## 2023-08-06 RX ADMIN — ACETAMINOPHEN 500 MG: 500 TABLET, FILM COATED ORAL at 00:16

## 2023-08-06 RX ADMIN — ALBUTEROL SULFATE 2.5 MG: 2.5 SOLUTION RESPIRATORY (INHALATION) at 07:55

## 2023-08-06 RX ADMIN — ALBUTEROL SULFATE 2.5 MG: 2.5 SOLUTION RESPIRATORY (INHALATION) at 11:15

## 2023-08-06 RX ADMIN — LISINOPRIL 20 MG: 20 TABLET ORAL at 09:09

## 2023-08-06 RX ADMIN — SODIUM CHLORIDE, PRESERVATIVE FREE 10 ML: 5 INJECTION INTRAVENOUS at 09:13

## 2023-08-06 RX ADMIN — AMLODIPINE BESYLATE 10 MG: 10 TABLET ORAL at 09:09

## 2023-08-06 RX ADMIN — SODIUM CHLORIDE, PRESERVATIVE FREE 10 ML: 5 INJECTION INTRAVENOUS at 09:16

## 2023-08-06 RX ADMIN — SODIUM CHLORIDE, PRESERVATIVE FREE 10 ML: 5 INJECTION INTRAVENOUS at 09:15

## 2023-08-06 RX ADMIN — Medication 100 MG: at 09:09

## 2023-08-06 RX ADMIN — IBUPROFEN 600 MG: 600 TABLET, FILM COATED ORAL at 09:15

## 2023-08-06 RX ADMIN — IBUPROFEN 600 MG: 600 TABLET, FILM COATED ORAL at 15:01

## 2023-08-06 RX ADMIN — CEFTRIAXONE 1000 MG: 1 INJECTION, POWDER, FOR SOLUTION INTRAMUSCULAR; INTRAVENOUS at 05:01

## 2023-08-06 RX ADMIN — OXYCODONE HYDROCHLORIDE 5 MG: 5 TABLET ORAL at 00:16

## 2023-08-06 RX ADMIN — BUDESONIDE 500 MCG: 0.5 SUSPENSION RESPIRATORY (INHALATION) at 07:55

## 2023-08-06 RX ADMIN — AZITHROMYCIN MONOHYDRATE 500 MG: 500 INJECTION, POWDER, LYOPHILIZED, FOR SOLUTION INTRAVENOUS at 05:01

## 2023-08-06 RX ADMIN — SODIUM CHLORIDE, PRESERVATIVE FREE 10 ML: 5 INJECTION INTRAVENOUS at 09:14

## 2023-08-06 RX ADMIN — MORPHINE SULFATE 2 MG: 2 INJECTION, SOLUTION INTRAMUSCULAR; INTRAVENOUS at 03:49

## 2023-08-06 RX ADMIN — HYDROCODONE BITARTRATE AND HOMATROPINE METHYLBROMIDE 5 ML: 5; 1.5 SOLUTION ORAL at 03:49

## 2023-08-06 ASSESSMENT — PAIN SCALES - GENERAL
PAINLEVEL_OUTOF10: 0
PAINLEVEL_OUTOF10: 0
PAINLEVEL_OUTOF10: 7

## 2023-08-06 ASSESSMENT — PAIN SCALES - WONG BAKER
WONGBAKER_NUMERICALRESPONSE: 0
WONGBAKER_NUMERICALRESPONSE: 0

## 2023-08-06 NOTE — CARE COORDINATION
Pt is for discharge home today with family. Referral called/faxed to [facility] with pt agreement for new pvt pay home O2 set up as ordered. No additional CM orders received or supportive care needs expressed at this time. [facility] PCP referral and [facility] info provided to pt with his discharge. 08/06/23 1931   Service Assessment   Patient's Healthcare Decision Maker is: Legal Next of Kin   Social/Functional History   Lives With Alone   Home Layout One level   Home Access Stairs to enter with rails   Entrance Stairs - Number of Steps 7   Entrance Stairs - Rails Left   Bathroom Shower/Tub Tub/Shower unit   Home Equipment None   ADL Assistance Independent   Homemaking Assistance Independent   Homemaking Responsibilities Yes   Ambulation Assistance Independent   Transfer Assistance Independent   Active  Yes   Occupation Full time employment;Self employed   Services At/After Discharge   Transition of Care Consult ([facility]) Discharge Planning;DME/Supply Assistance   Services At/After Discharge DME  ([facility] for new home O2 set up pvt pay)   [address] Provided? No   Mode of Transport at Discharge Other (see comment)  (family)   Confirm Follow Up Transport Family   Condition of Participation: Discharge Planning   The Plan for Transition of Care is related to the following treatment goals: Pt will return home with family at [address] The Patient and/or Patient Representative was provided with a Choice of Provider? Patient   The Patient and/Or Patient Representative agree with the Discharge Plan? Yes   Freedom of Choice list was provided with basic dialogue that supports the patient's individualized plan of care/goals, treatment preferences, and shares the quality data associated with the providers?   Yes

## 2023-08-06 NOTE — DISCHARGE SUMMARY
Hospitalist Discharge Summary   Admit Date:  2023  6:21 AM   DC Note date: 2023  Name:  Tara Gallardo   Age:  60 y.o. Sex:  male  :  1965   MRN:  [id number]   Room:  [facility]  PCP:  Pcp No    Presenting Complaint: No chief complaint on file. Initial Admission Diagnosis: Traumatic fracture of ribs of right side with pneumothorax [S22.41XA, S27. 0XXA]     Problem List for this Hospitalization (present on admission):    Principal Problem:    Closed traumatic fracture of ribs of right side with pneumothorax  Active Problems:    Alcohol abuse    Emphysema lung (HCC)    Smoker    Traumatic fracture of ribs of right side with pneumothorax  Resolved Problems:    * No resolved hospital problems. *      Hospital Course:  Tara Gallardo is a 60 y.o. male with past medical history significant for, tobacco use, alcohol use hypertension, not on medications, was admitted to intensivist service for small right-sided pneumothorax traumatic. 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 He fell down 3-4 steps and started having pain in his right chest.  CT chest on admission shows small anterolateral loculated pneumothorax and isolated right 11th rib fracture. Patient was seen by pulmonology without need for chest tube for PTX which is getting smaller after nitrogen washout. He is now down to 3L NC with exertion per RT eval. CM consulted to assist in home oxygen. Will need close follow-up with pulmonology in 1 week to re-eval PTX. Instructed for no heavy lifting, air travel, scuba for the next month. Also noted to have RLL infiltrate. Will complete antibiotic coverage with Augmentin at home. Has 11th rib fracture for which he can take Motrin 800mg tid OTC (max of 10 days)    Pt with radiographic evidence of emphysema.  Will cont prn albuterol but needs formal PFT's in pulm clinic (referral sent)    Also with  untreated HTN improved with norvasc and lisinpril (Rx's

## 2023-08-08 ENCOUNTER — TELEPHONE (OUTPATIENT)
Dept: PULMONOLOGY | Age: 58
End: 2023-08-08

## 2023-08-08 DIAGNOSIS — J43.2 CENTRILOBULAR EMPHYSEMA (HCC): Chronic | ICD-10-CM

## 2023-08-08 DIAGNOSIS — S27.0XXA CLOSED TRAUMATIC FRACTURE OF RIBS OF RIGHT SIDE WITH PNEUMOTHORAX: ICD-10-CM

## 2023-08-08 DIAGNOSIS — S22.41XA TRAUMATIC FRACTURE OF RIBS OF RIGHT SIDE WITH PNEUMOTHORAX: Primary | ICD-10-CM

## 2023-08-08 DIAGNOSIS — S22.41XA CLOSED TRAUMATIC FRACTURE OF RIBS OF RIGHT SIDE WITH PNEUMOTHORAX: ICD-10-CM

## 2023-08-08 DIAGNOSIS — S27.0XXA TRAUMATIC FRACTURE OF RIBS OF RIGHT SIDE WITH PNEUMOTHORAX: Primary | ICD-10-CM

## 2023-08-08 LAB
BACTERIA SPEC CULT: NORMAL
GRAM STN SPEC: NORMAL
SERVICE CMNT-IMP: NORMAL

## 2023-08-08 NOTE — TELEPHONE ENCOUNTER
----- Message from KAVIN Wynne CNP sent at 8/5/2023  8:27 AM EDT -----  Regarding: HOSP F/U  Please contact patient and arrange a MINS: 40 minute  7 day TCM Follow up with CXR and cPFTs prior to appointment.      Thank you,   KAVIN Wynne CNP

## 2023-08-08 NOTE — TELEPHONE ENCOUNTER
----- Message from Cirilo Marc sent at 8/7/2023 12:39 PM EDT -----  Regarding: FW: HOSP F/U    ----- Message -----  From: Juancho Navarrete MD  Sent: 8/5/2023  10:16 AM EDT  To: , #  Subject: HOSP F/U                                         Tcm 1 week please for pneumothorax.     Juancho Navarrete MD

## 2023-08-09 ENCOUNTER — TELEPHONE (OUTPATIENT)
Dept: INTERNAL MEDICINE CLINIC | Facility: CLINIC | Age: 58
End: 2023-08-09

## 2023-08-09 LAB
BACTERIA SPEC CULT: NORMAL
BACTERIA SPEC CULT: NORMAL
SERVICE CMNT-IMP: NORMAL
SERVICE CMNT-IMP: NORMAL

## 2023-08-09 NOTE — TELEPHONE ENCOUNTER
Spoke with patient regarding TCM follow-up and establishing care with PCP. He has not heard from pulmonology as yet but plans to call them for a follow-up appointment. Also interested in establishing care with PCP.   Will ask Hipolito Dalal to kindly assist.  Otherwise he is feeling better since hospital discharge

## 2023-08-09 NOTE — TELEPHONE ENCOUNTER
Care Transitions Initial Follow Up Call    Outreach made within 2 business days of discharge: Yes    Patient: Whitley Becerra Patient : 1965   MRN: [id number]      Reason for Admission:   Closed traumatic fracture of ribs of right side with pneumothorax  Alcohol abuse  Emphysema lung ([address])  Smoker  Traumatic fracture of ribs of right side with pneumothorax    Discharge Date: 23       Spoke with: Parth Whitten    Discharge department/facility: [address] Interactive Patient Contact:  Was patient able to fill all prescriptions: Yes  Was patient instructed to bring all medications to the follow-up visit: Yes  Is patient taking all medications as directed in the discharge summary?  Yes  Does patient understand their discharge instructions: Yes  Does patient have questions or concerns that need addressed prior to 7-14 day follow up office visit: no    Scheduled appointment with pulmonary within 7-14 days    Follow Up  Future Appointments   Date Time Provider [address] [place] [address]   2023  1:00 PM Corby Bower MD [facility]       Dorie Barron RN

## 2023-08-16 ENCOUNTER — TELEMEDICINE (OUTPATIENT)
Dept: PULMONOLOGY | Age: 58
End: 2023-08-16

## 2023-08-16 ENCOUNTER — HOSPITAL ENCOUNTER (OUTPATIENT)
Dept: GENERAL RADIOLOGY | Age: 58
Discharge: HOME | End: 2023-08-19

## 2023-08-16 DIAGNOSIS — S22.41XA CLOSED TRAUMATIC FRACTURE OF RIBS OF RIGHT SIDE WITH PNEUMOTHORAX: ICD-10-CM

## 2023-08-16 DIAGNOSIS — S27.0XXA TRAUMATIC FRACTURE OF RIBS OF RIGHT SIDE WITH PNEUMOTHORAX: Primary | ICD-10-CM

## 2023-08-16 DIAGNOSIS — S27.0XXA CLOSED TRAUMATIC FRACTURE OF RIBS OF RIGHT SIDE WITH PNEUMOTHORAX: ICD-10-CM

## 2023-08-16 DIAGNOSIS — J96.01 ACUTE HYPOXEMIC RESPIRATORY FAILURE (HCC): ICD-10-CM

## 2023-08-16 DIAGNOSIS — J43.2 CENTRILOBULAR EMPHYSEMA (HCC): Chronic | ICD-10-CM

## 2023-08-16 DIAGNOSIS — Z87.891 HISTORY OF CIGARETTE SMOKING: ICD-10-CM

## 2023-08-16 DIAGNOSIS — S27.0XXA TRAUMATIC FRACTURE OF RIBS OF RIGHT SIDE WITH PNEUMOTHORAX: ICD-10-CM

## 2023-08-16 DIAGNOSIS — S22.41XA TRAUMATIC FRACTURE OF RIBS OF RIGHT SIDE WITH PNEUMOTHORAX: ICD-10-CM

## 2023-08-16 DIAGNOSIS — S22.41XA TRAUMATIC FRACTURE OF RIBS OF RIGHT SIDE WITH PNEUMOTHORAX: Primary | ICD-10-CM

## 2023-08-16 PROCEDURE — 71046 X-RAY EXAM CHEST 2 VIEWS: CPT

## 2023-08-16 NOTE — PROGRESS NOTES
Name:  De Michel  YOB: 1965   MRN: [id number]      Office Visit: 8/16/2023        The patient is seen by synchronous (real-time) audio-video technology on [employer]. Consent:  The patient/healthcare decision maker is aware that this patient-initiated Telehealth encounter is a billable service, with coverage as determined by his insurance carrier. The patient is aware that he/she may receive a bill and has provided verbal consent to proceed: Yes     I was at [facility] while conducting this visit. ASSESSMENT AND PLAN:  (Medical Decision Making)    Impression: 60 y.o. male heavy smoking, alcohol abuse and marijuana quit since discharge from his hospitalization for fall and pneumothorax. Has underlying eczema    1. Traumatic fracture of ribs of right side with pneumothorax  Appears resolved on chest x-ray. Rib pain is slowly improving    2. Centrilobular emphysema (HCC)  Continue albuterol. I will try to get him PFTs when he returns in a few months after his pain is resolved and he continues to work on smoking cessation currently. 3. Acute hypoxemic respiratory failure (HCC)  Seems improved, but we agreed to at least continue use oxygen at night and he does have an oximeter that he can measure during the day as long as he is back percent or better he does not have to wear his oxygen. We will reevaluate his oxygen needs when he returns in a few months. 4. History of cigarette smoking  He is actively working on his cessation. Has not smoked since discharge though having significant irritability. He had a CT scan during his hospitalization without a pulmonary nodules, but would benefit from lung cancer screening with the next scan 8/2024. No orders of the defined types were placed in this encounter. No orders of the defined types were placed in this encounter.     Follow-up and Dispositions    Return in about 3 months (around 11/16/2023) for With EZ[facility] Dr. Elvan Athens

## 2023-08-21 ENCOUNTER — HOSPITAL ENCOUNTER (EMERGENCY)
Age: 58
Discharge: HOME | End: 2023-08-21
Attending: STUDENT IN AN ORGANIZED HEALTH CARE EDUCATION/TRAINING PROGRAM

## 2023-08-21 ENCOUNTER — APPOINTMENT (OUTPATIENT)
Dept: ULTRASOUND IMAGING | Age: 58
End: 2023-08-21

## 2023-08-21 ENCOUNTER — APPOINTMENT (OUTPATIENT)
Dept: CT IMAGING | Age: 58
End: 2023-08-21

## 2023-08-21 VITALS
BODY MASS INDEX: 16.06 KG/M2 | SYSTOLIC BLOOD PRESSURE: 151 MMHG | OXYGEN SATURATION: 98 % | HEART RATE: 65 BPM | TEMPERATURE: 98.9 F | RESPIRATION RATE: 19 BRPM | WEIGHT: 106 LBS | HEIGHT: 68 IN | DIASTOLIC BLOOD PRESSURE: 96 MMHG

## 2023-08-21 DIAGNOSIS — I71.43 INFRARENAL ABDOMINAL AORTIC ANEURYSM (AAA) WITHOUT RUPTURE (HCC): ICD-10-CM

## 2023-08-21 DIAGNOSIS — K40.20 BILATERAL INGUINAL HERNIA WITHOUT OBSTRUCTION OR GANGRENE, RECURRENCE NOT SPECIFIED: ICD-10-CM

## 2023-08-21 DIAGNOSIS — K41.90 UNILATERAL FEMORAL HERNIA WITHOUT OBSTRUCTION OR GANGRENE, RECURRENCE NOT SPECIFIED: Primary | ICD-10-CM

## 2023-08-21 DIAGNOSIS — K83.8 COMMON BILE DUCT DILATATION: ICD-10-CM

## 2023-08-21 LAB
ALBUMIN SERPL-MCNC: 4.1 G/DL (ref 3.5–5)
ALBUMIN/GLOB SERPL: 1.5 (ref 0.4–1.6)
ALP SERPL-CCNC: 76 U/L (ref 45–117)
ALT SERPL-CCNC: 13 U/L (ref 13–61)
ANION GAP SERPL CALC-SCNC: 8 MMOL/L (ref 2–11)
APPEARANCE UR: ABNORMAL
AST SERPL-CCNC: 13 U/L (ref 15–37)
BASOPHILS # BLD: 0.1 K/UL (ref 0–0.2)
BASOPHILS NFR BLD: 1 % (ref 0–2)
BILIRUB SERPL-MCNC: <0.2 MG/DL (ref 0.2–1.1)
BILIRUB UR QL: ABNORMAL
BUN SERPL-MCNC: 13 MG/DL (ref 6–23)
CALCIUM SERPL-MCNC: 9.7 MG/DL (ref 8.3–10.4)
CHLORIDE SERPL-SCNC: 98 MMOL/L (ref 98–107)
CO2 SERPL-SCNC: 30 MMOL/L (ref 21–32)
COLOR UR: ABNORMAL
CREAT SERPL-MCNC: 0.53 MG/DL (ref 0.8–1.5)
DIFFERENTIAL METHOD BLD: ABNORMAL
EOSINOPHIL # BLD: 0.1 K/UL (ref 0–0.8)
EOSINOPHIL NFR BLD: 1 % (ref 0.5–7.8)
ERYTHROCYTE [DISTWIDTH] IN BLOOD BY AUTOMATED COUNT: 10.9 % (ref 11.9–14.6)
GLOBULIN SER CALC-MCNC: 2.7 G/DL (ref 2.8–4.5)
GLUCOSE SERPL-MCNC: 92 MG/DL (ref 65–100)
GLUCOSE UR STRIP.AUTO-MCNC: NEGATIVE MG/DL
HCT VFR BLD AUTO: 37.8 % (ref 41.1–50.3)
HGB BLD-MCNC: 12.9 G/DL (ref 13.6–17.2)
HGB UR QL STRIP: NEGATIVE
IMM GRANULOCYTES # BLD AUTO: 0 K/UL (ref 0–0.5)
IMM GRANULOCYTES NFR BLD AUTO: 0 % (ref 0–5)
KETONES UR QL STRIP.AUTO: NEGATIVE MG/DL
LACTATE SERPL-SCNC: 0.8 MMOL/L (ref 0.4–2)
LEUKOCYTE ESTERASE UR QL STRIP.AUTO: NEGATIVE
LIPASE SERPL-CCNC: 39 U/L (ref 13–60)
LYMPHOCYTES # BLD: 1.8 K/UL (ref 0.5–4.6)
LYMPHOCYTES NFR BLD: 22 % (ref 13–44)
MCH RBC QN AUTO: 32.7 PG (ref 26.1–32.9)
MCHC RBC AUTO-ENTMCNC: 34.1 G/DL (ref 31.4–35)
MCV RBC AUTO: 95.7 FL (ref 82–102)
MONOCYTES # BLD: 1 K/UL (ref 0.1–1.3)
MONOCYTES NFR BLD: 12 % (ref 4–12)
NEUTS SEG # BLD: 5.5 K/UL (ref 1.7–8.2)
NEUTS SEG NFR BLD: 64 % (ref 43–78)
NITRITE UR QL STRIP.AUTO: NEGATIVE
NRBC # BLD: 0 K/UL (ref 0–0.2)
PH UR STRIP: 5 (ref 5–9)
PLATELET # BLD AUTO: 466 K/UL (ref 150–450)
PMV BLD AUTO: 7.8 FL (ref 9.4–12.3)
POTASSIUM SERPL-SCNC: 4.3 MMOL/L (ref 3.5–5.1)
PROT SERPL-MCNC: 6.8 G/DL (ref 6.4–8.2)
PROT UR STRIP-MCNC: NEGATIVE MG/DL
RBC # BLD AUTO: 3.95 M/UL (ref 4.23–5.6)
SODIUM SERPL-SCNC: 136 MMOL/L (ref 133–143)
SP GR UR REFRACTOMETRY: >=1.03 (ref 1–1.02)
UROBILINOGEN UR QL STRIP.AUTO: 0.2 EU/DL (ref 0.2–1)
WBC # BLD AUTO: 8.5 K/UL (ref 4.3–11.1)

## 2023-08-21 PROCEDURE — 96374 THER/PROPH/DIAG INJ IV PUSH: CPT

## 2023-08-21 PROCEDURE — 76705 ECHO EXAM OF ABDOMEN: CPT

## 2023-08-21 PROCEDURE — 99285 EMERGENCY DEPT VISIT HI MDM: CPT

## 2023-08-21 PROCEDURE — 83605 ASSAY OF LACTIC ACID: CPT

## 2023-08-21 PROCEDURE — 96375 TX/PRO/DX INJ NEW DRUG ADDON: CPT

## 2023-08-21 PROCEDURE — 83690 ASSAY OF LIPASE: CPT

## 2023-08-21 PROCEDURE — 2580000003 HC RX 258: Performed by: STUDENT IN AN ORGANIZED HEALTH CARE EDUCATION/TRAINING PROGRAM

## 2023-08-21 PROCEDURE — 6360000002 HC RX W HCPCS: Performed by: STUDENT IN AN ORGANIZED HEALTH CARE EDUCATION/TRAINING PROGRAM

## 2023-08-21 PROCEDURE — 80053 COMPREHEN METABOLIC PANEL: CPT

## 2023-08-21 PROCEDURE — 6360000004 HC RX CONTRAST MEDICATION: Performed by: STUDENT IN AN ORGANIZED HEALTH CARE EDUCATION/TRAINING PROGRAM

## 2023-08-21 PROCEDURE — 85025 COMPLETE CBC W/AUTO DIFF WBC: CPT

## 2023-08-21 PROCEDURE — 81003 URINALYSIS AUTO W/O SCOPE: CPT

## 2023-08-21 PROCEDURE — 74177 CT ABD & PELVIS W/CONTRAST: CPT

## 2023-08-21 RX ORDER — 0.9 % SODIUM CHLORIDE 0.9 %
1000 INTRAVENOUS SOLUTION INTRAVENOUS
Status: COMPLETED | OUTPATIENT
Start: 2023-08-21 | End: 2023-08-21

## 2023-08-21 RX ORDER — POLYETHYLENE GLYCOL 3350 17 G/17G
17 POWDER, FOR SOLUTION ORAL 2 TIMES DAILY PRN
Qty: 507 G | Refills: 1 | Status: SHIPPED | OUTPATIENT
Start: 2023-08-21 | End: 2023-09-20

## 2023-08-21 RX ORDER — DOCUSATE SODIUM 100 MG/1
100 CAPSULE, LIQUID FILLED ORAL 2 TIMES DAILY
Qty: 20 CAPSULE | Refills: 0 | Status: SHIPPED | OUTPATIENT
Start: 2023-08-21

## 2023-08-21 RX ORDER — ONDANSETRON 2 MG/ML
4 INJECTION INTRAMUSCULAR; INTRAVENOUS
Status: COMPLETED | OUTPATIENT
Start: 2023-08-21 | End: 2023-08-21

## 2023-08-21 RX ORDER — HYDROCODONE BITARTRATE AND ACETAMINOPHEN 10; 325 MG/1; MG/1
1 TABLET ORAL EVERY 6 HOURS PRN
Qty: 15 TABLET | Refills: 0 | Status: SHIPPED | OUTPATIENT
Start: 2023-08-21 | End: 2023-09-20

## 2023-08-21 RX ORDER — MORPHINE SULFATE 4 MG/ML
4 INJECTION INTRAVENOUS ONCE
Status: COMPLETED | OUTPATIENT
Start: 2023-08-21 | End: 2023-08-21

## 2023-08-21 RX ORDER — ONDANSETRON 4 MG/1
4 TABLET, FILM COATED ORAL 3 TIMES DAILY PRN
Qty: 15 TABLET | Refills: 2 | Status: SHIPPED | OUTPATIENT
Start: 2023-08-21

## 2023-08-21 RX ADMIN — IOPAMIDOL 100 ML: 755 INJECTION, SOLUTION INTRAVENOUS at 19:47

## 2023-08-21 RX ADMIN — MORPHINE SULFATE 4 MG: 4 INJECTION, SOLUTION INTRAMUSCULAR; INTRAVENOUS at 18:40

## 2023-08-21 RX ADMIN — ONDANSETRON 4 MG: 2 INJECTION INTRAMUSCULAR; INTRAVENOUS at 18:40

## 2023-08-21 RX ADMIN — SODIUM CHLORIDE 1000 ML: 9 INJECTION, SOLUTION INTRAVENOUS at 18:39

## 2023-08-21 ASSESSMENT — ENCOUNTER SYMPTOMS
CONSTIPATION: 1
ABDOMINAL PAIN: 1

## 2023-08-21 ASSESSMENT — LIFESTYLE VARIABLES
HOW OFTEN DO YOU HAVE A DRINK CONTAINING ALCOHOL: NEVER
HOW MANY STANDARD DRINKS CONTAINING ALCOHOL DO YOU HAVE ON A TYPICAL DAY: PATIENT DOES NOT DRINK

## 2023-08-21 ASSESSMENT — PAIN - FUNCTIONAL ASSESSMENT
PAIN_FUNCTIONAL_ASSESSMENT: 0-10
PAIN_FUNCTIONAL_ASSESSMENT: NONE - DENIES PAIN

## 2023-08-21 ASSESSMENT — PAIN SCALES - GENERAL: PAINLEVEL_OUTOF10: 4

## 2023-08-21 ASSESSMENT — PAIN DESCRIPTION - LOCATION: LOCATION: GROIN

## 2023-08-21 NOTE — ED PROVIDER NOTES
stones are seen in the bilateral ureters as well. Gastrointestinal tract: There is colonic diverticulosis without visible acute   diverticulitis. Relatively large colonic stool burden. No small bowel   obstruction. Aorta/IVC: Atherosclerotic calcifications are seen in the aorta and branching   vessels. There is an infrarenal abdominal aortic aneurysm measuring 2.8 cm in   cross-sectional diameter. IVC normal.    Abdominal wall: There are small bilateral fat-containing inguinal hernias. There   is also a small right femoral hernia containing fat and a small amount of   fluid. No bowel in the hernias. PELVIS:    Urinary bladder: Underdistended at the time of imaging with mild circumferential   wall thickening. Reproductive organs: No acute abnormality seen by CT.    BONES:  No acute focal bony abnormality seen. Impression    Small right femoral hernia containing fat and a small amount of fluid. Small bilateral fat-containing inguinal hernias. No bowel in the hernias at this time. Relatively large amount of stool in the colon. Dilated extrahepatic bile ducts including the common bile duct and main   pancreatic duct. No obstructing lesion or stone seen on this exam, though an   unseen distal biliary obstructive process/lesion is possible given the   appearance. Correlation with biliary laboratory markers recommended. Further   evaluation with right upper quadrant ultrasound and/or MRCP recommended. Infrarenal abdominal aortic aneurysm measuring 2.8 cm in cross-sectional   diameter. Follow-up consultation with vascular surgery recommended. Mild circumferential urinary bladder wall thickening could be due to   underdistention at the time of imaging or cystitis. Correlation with urinalysis   recommended. Colonic diverticulosis without visible acute diverticulitis. RECOMMENDATIONS:    Further evaluation of the biliary tree as above.     Follow-up regarding the infrarenal abdominal

## 2023-08-21 NOTE — ED TRIAGE NOTES
Pt ambulatory to triage. Pt reports knot to the right side of groin that he first noticed on Saturday and has gotten larger everyday. Pt states he was seen here on 8/4 for pneumonia and was admitted to [facility]. Pt states he has followed up with pulmonologist and has a new patient appointment in October. Pt also reports weight loss of 6lb since being discharged from hospital, difficulty sleeping and constipation. Pt states he had a bowel movement Sunday. Pt states he has increased his fiber and protein.

## 2023-08-22 NOTE — DISCHARGE INSTRUCTIONS
You require close follow-up with a general surgeon as discussed. This referral has been arranged. Please follow-up as directed. In addition you have been provided referral to vascular surgery specialist for monitoring of your aortic aneurysm noted on CT tonight. Take the medication prescribed as directed for your groin pain, you used Colace daily to prevent constipation and MiraLAX as needed for constipation unrelieved with stool softener. Return immediately for worsening symptoms, concerns or questions.

## 2023-08-23 ENCOUNTER — CLINICAL DOCUMENTATION (OUTPATIENT)
Dept: VASCULAR SURGERY | Age: 58
End: 2023-08-23

## 2023-08-23 NOTE — PROGRESS NOTES
Referral to our office for an abdominal aortic aneurysm measuring 2.8 cm. CTA reviewed by myself and Dr. Rian Pulido. Pt DOES NOT have an abdominal aortic aneurysm as we do not consider it an aneurysm until it reaches 3.0 cm. Does not need an appt with our office. I have relayed this to the pt and he verbalizes understanding.        Nirali Del Valle, NP

## 2023-08-24 ENCOUNTER — PREP FOR PROCEDURE (OUTPATIENT)
Dept: SURGERY | Age: 58
End: 2023-08-24

## 2023-08-24 ENCOUNTER — OFFICE VISIT (OUTPATIENT)
Dept: SURGERY | Age: 58
End: 2023-08-24

## 2023-08-24 VITALS
SYSTOLIC BLOOD PRESSURE: 110 MMHG | HEART RATE: 95 BPM | OXYGEN SATURATION: 100 % | HEIGHT: 68 IN | WEIGHT: 106 LBS | BODY MASS INDEX: 16.06 KG/M2 | DIASTOLIC BLOOD PRESSURE: 64 MMHG

## 2023-08-24 DIAGNOSIS — K40.21 BILATERAL RECURRENT INGUINAL HERNIA WITHOUT OBSTRUCTION OR GANGRENE: ICD-10-CM

## 2023-08-24 PROBLEM — K40.20 BILATERAL INGUINAL HERNIA WITHOUT OBSTRUCTION OR GANGRENE: Status: ACTIVE | Noted: 2023-08-24

## 2023-08-24 PROBLEM — K40.90 INGUINAL HERNIA: Status: ACTIVE | Noted: 2023-08-24

## 2023-08-24 PROCEDURE — 99204 OFFICE O/P NEW MOD 45 MIN: CPT | Performed by: STUDENT IN AN ORGANIZED HEALTH CARE EDUCATION/TRAINING PROGRAM

## 2023-08-24 NOTE — PROGRESS NOTES
in  cross-sectional diameter. IVC normal.    Abdominal wall: There are small bilateral fat-containing inguinal hernias. There  is also a small right femoral hernia containing fat and a small amount of  fluid. No bowel in the hernias. PELVIS:    Urinary bladder: Underdistended at the time of imaging with mild circumferential  wall thickening. Reproductive organs: No acute abnormality seen by CT.    BONES:  No acute focal bony abnormality seen. Impression  Small right femoral hernia containing fat and a small amount of fluid. Small bilateral fat-containing inguinal hernias. No bowel in the hernias at this time. Relatively large amount of stool in the colon. Dilated extrahepatic bile ducts including the common bile duct and main  pancreatic duct. No obstructing lesion or stone seen on this exam, though an  unseen distal biliary obstructive process/lesion is possible given the  appearance. Correlation with biliary laboratory markers recommended. Further  evaluation with right upper quadrant ultrasound and/or MRCP recommended. Infrarenal abdominal aortic aneurysm measuring 2.8 cm in cross-sectional  diameter. Follow-up consultation with vascular surgery recommended. Mild circumferential urinary bladder wall thickening could be due to  underdistention at the time of imaging or cystitis. Correlation with urinalysis  recommended. Colonic diverticulosis without visible acute diverticulitis. RECOMMENDATIONS:    Further evaluation of the biliary tree as above. Follow-up regarding the infrarenal abdominal aortic aneurysm as above. Correlation with urinalysis as above. I communicated these results (including discussion of the bile ducts and  aneurysm) by phone to Dr. Genet Mccabe at 8:45 PM August 21, 2023 [place] time. Nathalie Burks M.D.  8/21/2023 8:47:00 PM        Assessment:  Bilateral inguinal hernia    Plan:  Patient does have a femoral hernia.   With this I would like to

## 2023-08-25 RX ORDER — SODIUM CHLORIDE 0.9 % (FLUSH) 0.9 %
5-40 SYRINGE (ML) INJECTION EVERY 12 HOURS SCHEDULED
Status: CANCELLED | OUTPATIENT
Start: 2023-08-25

## 2023-08-25 RX ORDER — SODIUM CHLORIDE 0.9 % (FLUSH) 0.9 %
5-40 SYRINGE (ML) INJECTION PRN
Status: CANCELLED | OUTPATIENT
Start: 2023-08-25

## 2023-08-25 RX ORDER — SODIUM CHLORIDE 9 MG/ML
INJECTION, SOLUTION INTRAVENOUS PRN
Status: CANCELLED | OUTPATIENT
Start: 2023-08-25

## 2025-05-01 ENCOUNTER — APPOINTMENT (OUTPATIENT)
Dept: CT IMAGING | Age: 60
End: 2025-05-01
Payer: COMMERCIAL

## 2025-05-01 ENCOUNTER — APPOINTMENT (OUTPATIENT)
Dept: GENERAL RADIOLOGY | Age: 60
End: 2025-05-01
Payer: COMMERCIAL

## 2025-05-01 ENCOUNTER — HOSPITAL ENCOUNTER (EMERGENCY)
Age: 60
Discharge: HOME | End: 2025-05-01
Attending: STUDENT IN AN ORGANIZED HEALTH CARE EDUCATION/TRAINING PROGRAM
Payer: COMMERCIAL

## 2025-05-01 VITALS
SYSTOLIC BLOOD PRESSURE: 172 MMHG | DIASTOLIC BLOOD PRESSURE: 93 MMHG | HEART RATE: 69 BPM | HEIGHT: 68 IN | WEIGHT: 112.4 LBS | BODY MASS INDEX: 17.03 KG/M2 | TEMPERATURE: 98.4 F | OXYGEN SATURATION: 97 % | RESPIRATION RATE: 16 BRPM

## 2025-05-01 DIAGNOSIS — S20.219A CONTUSION OF CHEST WALL, UNSPECIFIED LATERALITY, INITIAL ENCOUNTER: ICD-10-CM
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
## 2025-05-01 LAB
ALBUMIN SERPL-MCNC: 4.4 G/DL (ref 3.2–4.6)
ALBUMIN/GLOB SERPL: 1.5 (ref 1–1.9)
ALP SERPL-CCNC: 63 U/L (ref 40–129)
ALT SERPL-CCNC: 25 U/L (ref 12–65)
ANION GAP SERPL CALC-SCNC: 11 MMOL/L (ref 7–16)
AST SERPL-CCNC: 23 U/L (ref 15–37)
BASOPHILS # BLD: 0.04 K/UL (ref 0–0.2)
BASOPHILS NFR BLD: 0.3 % (ref 0–2)
BILIRUB SERPL-MCNC: 0.5 MG/DL (ref 0–1.2)
BUN SERPL-MCNC: 12 MG/DL (ref 8–23)
CALCIUM SERPL-MCNC: 9.8 MG/DL (ref 8.8–10.2)
CHLORIDE SERPL-SCNC: 96 MMOL/L (ref 98–107)
CO2 SERPL-SCNC: 28 MMOL/L (ref 20–29)
CREAT SERPL-MCNC: 0.58 MG/DL (ref 0.8–1.3)
DIFFERENTIAL METHOD BLD: ABNORMAL
EKG ATRIAL RATE: 68 BPM
EKG DIAGNOSIS: NORMAL
EKG P AXIS: 80 DEGREES
EKG P-R INTERVAL: 123 MS
EKG Q-T INTERVAL: 418 MS
EKG QRS DURATION: 72 MS
EKG QTC CALCULATION (BAZETT): 448 MS
EKG R AXIS: 80 DEGREES
EKG T AXIS: 67 DEGREES
EKG VENTRICULAR RATE: 69 BPM
EOSINOPHIL # BLD: 0.07 K/UL (ref 0–0.8)
EOSINOPHIL NFR BLD: 0.5 % (ref 0.5–7.8)
ERYTHROCYTE [DISTWIDTH] IN BLOOD BY AUTOMATED COUNT: 12.7 % (ref 11.9–14.6)
GLOBULIN SER CALC-MCNC: 2.9 G/DL (ref 2.3–3.5)
GLUCOSE SERPL-MCNC: 159 MG/DL (ref 65–100)
HCT VFR BLD AUTO: 41.1 % (ref 41.1–50.3)
HGB BLD-MCNC: 14.3 G/DL (ref 13.6–17.2)
IMM GRANULOCYTES # BLD AUTO: 0.06 K/UL (ref 0–0.5)
IMM GRANULOCYTES NFR BLD AUTO: 0.5 % (ref 0–5)
LYMPHOCYTES # BLD: 1.29 K/UL (ref 0.5–4.6)
LYMPHOCYTES NFR BLD: 10 % (ref 13–44)
MCH RBC QN AUTO: 33 PG (ref 26.1–32.9)
MCHC RBC AUTO-ENTMCNC: 34.8 G/DL (ref 31.4–35)
MCV RBC AUTO: 94.9 FL (ref 82–102)
MONOCYTES # BLD: 0.69 K/UL (ref 0.1–1.3)
MONOCYTES NFR BLD: 5.3 % (ref 4–12)
NEUTS SEG # BLD: 10.79 K/UL (ref 1.7–8.2)
NEUTS SEG NFR BLD: 83.4 % (ref 43–78)
NRBC # BLD: 0 K/UL (ref 0–0.2)
PLATELET # BLD AUTO: 343 K/UL (ref 150–450)
PMV BLD AUTO: 8 FL (ref 9.4–12.3)
POTASSIUM SERPL-SCNC: 4.5 MMOL/L (ref 3.5–5.1)
PROT SERPL-MCNC: 7.3 G/DL (ref 6.3–8.2)
RBC # BLD AUTO: 4.33 M/UL (ref 4.23–5.6)
SODIUM SERPL-SCNC: 135 MMOL/L (ref 133–143)
WBC # BLD AUTO: 12.9 K/UL (ref 4.3–11.1)

## 2025-05-01 PROCEDURE — 93005 ELECTROCARDIOGRAM TRACING: CPT | Performed by: STUDENT IN AN ORGANIZED HEALTH CARE EDUCATION/TRAINING PROGRAM

## 2025-05-01 PROCEDURE — 85025 COMPLETE CBC W/AUTO DIFF WBC: CPT

## 2025-05-01 PROCEDURE — 99285 EMERGENCY DEPT VISIT HI MDM: CPT

## 2025-05-01 PROCEDURE — 93010 ELECTROCARDIOGRAM REPORT: CPT | Performed by: INTERNAL MEDICINE

## 2025-05-01 PROCEDURE — 6360000002 HC RX W HCPCS: Performed by: STUDENT IN AN ORGANIZED HEALTH CARE EDUCATION/TRAINING PROGRAM

## 2025-05-01 PROCEDURE — 96374 THER/PROPH/DIAG INJ IV PUSH: CPT

## 2025-05-01 PROCEDURE — 80053 COMPREHEN METABOLIC PANEL: CPT

## 2025-05-01 PROCEDURE — 71260 CT THORAX DX C+: CPT

## 2025-05-01 PROCEDURE — 96375 TX/PRO/DX INJ NEW DRUG ADDON: CPT

## 2025-05-01 PROCEDURE — 6360000004 HC RX CONTRAST MEDICATION: Performed by: STUDENT IN AN ORGANIZED HEALTH CARE EDUCATION/TRAINING PROGRAM

## 2025-05-01 RX ORDER — KETOROLAC TROMETHAMINE 15 MG/ML
15 INJECTION, SOLUTION INTRAMUSCULAR; INTRAVENOUS ONCE
Status: COMPLETED | OUTPATIENT
Start: 2025-05-01 | End: 2025-05-01

## 2025-05-01 RX ORDER — IOPAMIDOL 755 MG/ML
80 INJECTION, SOLUTION INTRAVASCULAR
Status: COMPLETED | OUTPATIENT
Start: 2025-05-01 | End: 2025-05-01

## 2025-05-01 RX ORDER — CYCLOBENZAPRINE HCL 10 MG
10 TABLET ORAL 3 TIMES DAILY PRN
Qty: 20 TABLET | Refills: 0 | Status: SHIPPED | OUTPATIENT
Start: 2025-05-01 | End: 2025-05-11

## 2025-05-01 RX ORDER — ONDANSETRON 2 MG/ML
4 INJECTION INTRAMUSCULAR; INTRAVENOUS
Status: COMPLETED | OUTPATIENT
Start: 2025-05-01 | End: 2025-05-01

## 2025-05-01 RX ORDER — IBUPROFEN 800 MG/1
800 TABLET, FILM COATED ORAL EVERY 6 HOURS PRN
Qty: 20 TABLET | Refills: 0 | Status: SHIPPED | OUTPATIENT
Start: 2025-05-01

## 2025-05-01 RX ORDER — MORPHINE SULFATE 4 MG/ML
4 INJECTION, SOLUTION INTRAMUSCULAR; INTRAVENOUS ONCE
Refills: 0 | Status: COMPLETED | OUTPATIENT
Start: 2025-05-01 | End: 2025-05-01

## 2025-05-01 RX ADMIN — MORPHINE SULFATE 4 MG: 4 INJECTION INTRAVENOUS at 12:56

## 2025-05-01 RX ADMIN — ONDANSETRON 4 MG: 2 INJECTION, SOLUTION INTRAMUSCULAR; INTRAVENOUS at 12:56

## 2025-05-01 RX ADMIN — IOPAMIDOL 80 ML: 755 INJECTION, SOLUTION INTRAVENOUS at 13:40

## 2025-05-01 RX ADMIN — KETOROLAC TROMETHAMINE 15 MG: 15 INJECTION, SOLUTION INTRAMUSCULAR; INTRAVENOUS at 14:27

## 2025-05-01 ASSESSMENT — LIFESTYLE VARIABLES
HOW MANY STANDARD DRINKS CONTAINING ALCOHOL DO YOU HAVE ON A TYPICAL DAY: PATIENT DOES NOT DRINK
HOW OFTEN DO YOU HAVE A DRINK CONTAINING ALCOHOL: NEVER

## 2025-05-01 ASSESSMENT — PAIN DESCRIPTION - LOCATION
LOCATION: CHEST

## 2025-05-01 ASSESSMENT — PAIN DESCRIPTION - DESCRIPTORS: DESCRIPTORS: DISCOMFORT;SORE;PRESSURE

## 2025-05-01 ASSESSMENT — PAIN - FUNCTIONAL ASSESSMENT: PAIN_FUNCTIONAL_ASSESSMENT: 0-10

## 2025-05-01 ASSESSMENT — PAIN SCALES - GENERAL
PAINLEVEL_OUTOF10: 2
PAINLEVEL_OUTOF10: 5
PAINLEVEL_OUTOF10: 5
PAINLEVEL_OUTOF10: 4
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
## 2025-05-05 NOTE — ED PROVIDER NOTES
- 12.0 %    Eosinophils % 0.5 0.5 - 7.8 %    Basophils % 0.3 0.0 - 2.0 %    Immature Granulocytes % 0.5 0.0 - 5.0 %    Neutrophils Absolute 10.79 (H) 1.70 - 8.20 K/UL    Lymphocytes Absolute 1.29 0.50 - 4.60 K/UL    Monocytes Absolute 0.69 0.10 - 1.30 K/UL    Eosinophils Absolute 0.07 0.00 - 0.80 K/UL    Basophils Absolute 0.04 0.00 - 0.20 K/UL    Immature Granulocytes Absolute 0.06 0.0 - 0.5 K/UL   Comprehensive Metabolic Panel   Result Value Ref Range    Sodium 135 133 - 143 mmol/L    Potassium 4.5 3.5 - 5.1 mmol/L    Chloride 96 (L) 98 - 107 mmol/L    CO2 28 20 - 29 mmol/L    Anion Gap 11 7 - 16 mmol/L    Glucose 159 (H) 65 - 100 mg/dL    BUN 12 8 - 23 MG/DL    Creatinine 0.58 (L) 0.80 - 1.30 MG/DL    Est, Glom Filt Rate >90 >60 ml/min/1.73m2    Calcium 9.8 8.8 - 10.2 MG/DL    Total Bilirubin 0.5 0.0 - 1.2 MG/DL    ALT 25 12 - 65 U/L    AST 23 15 - 37 U/L    Alk Phosphatase 63 40 - 129 U/L    Total Protein 7.3 6.3 - 8.2 g/dL    Albumin 4.4 3.2 - 4.6 g/dL    Globulin 2.9 2.3 - 3.5 g/dL    Albumin/Globulin Ratio 1.5 1.0 - 1.9     EKG 12 Lead   Result Value Ref Range    Ventricular Rate 69 BPM    Atrial Rate 68 BPM    P-R Interval 123 ms    QRS Duration 72 ms    Q-T Interval 418 ms    QTc Calculation (Bazett) 448 ms    P Axis 80 degrees    R Axis 80 degrees    T Axis 67 degrees    Diagnosis       Sinus rhythm  Consider left atrial enlargement  Left ventricular hypertrophy    Confirmed by LUBNA BINGHAM (), TEODORO RESTREPO ([id number]) on 5/1/2025 12:56:09 PM           CT CHEST W CONTRAST   Final Result   Severe bullous emphysema worse than 8/4/2023.   No pneumothorax. No pulmonary contusion. No acute pneumonia. No pleural   effusion.   New atelectasis right upper lobe since 8/4/2023.   No significant lung nodules.   No abnormal distention of the pulmonary arteries on today's study.   No fracture of bony thorax.      Electronically signed by Chandra Squires                   No results for input(s): \"COVID19\" in the last 72

## 2025-06-12 ENCOUNTER — HOSPITAL ENCOUNTER (EMERGENCY)
Age: 60
Discharge: HOME | End: 2025-06-12
Attending: STUDENT IN AN ORGANIZED HEALTH CARE EDUCATION/TRAINING PROGRAM

## 2025-06-12 ENCOUNTER — APPOINTMENT (OUTPATIENT)
Dept: CT IMAGING | Age: 60
End: 2025-06-12

## 2025-06-12 VITALS
SYSTOLIC BLOOD PRESSURE: 166 MMHG | RESPIRATION RATE: 18 BRPM | DIASTOLIC BLOOD PRESSURE: 95 MMHG | HEIGHT: 66 IN | TEMPERATURE: 98.4 F | WEIGHT: 110 LBS | HEART RATE: 80 BPM | OXYGEN SATURATION: 90 % | BODY MASS INDEX: 17.68 KG/M2

## 2025-06-12 DIAGNOSIS — S32.040A COMPRESSION FRACTURE OF L4 LUMBAR VERTEBRA, CLOSED, INITIAL ENCOUNTER (HCC): Primary | ICD-10-CM

## 2025-06-12 LAB
ALBUMIN SERPL-MCNC: 4.4 G/DL (ref 3.2–4.6)
ALBUMIN/GLOB SERPL: 1.6 (ref 1–1.9)
ALP SERPL-CCNC: 79 U/L (ref 40–129)
ALT SERPL-CCNC: 21 U/L (ref 12–65)
ANION GAP SERPL CALC-SCNC: 9 MMOL/L (ref 7–16)
AST SERPL-CCNC: 27 U/L (ref 15–37)
BASOPHILS # BLD: 0.03 K/UL (ref 0–0.2)
BASOPHILS NFR BLD: 0.2 % (ref 0–2)
BILIRUB SERPL-MCNC: 0.4 MG/DL (ref 0–1.2)
BUN SERPL-MCNC: 8 MG/DL (ref 8–23)
CALCIUM SERPL-MCNC: 9.6 MG/DL (ref 8.8–10.2)
CHLORIDE SERPL-SCNC: 96 MMOL/L (ref 98–107)
CO2 SERPL-SCNC: 29 MMOL/L (ref 20–29)
CREAT SERPL-MCNC: 0.69 MG/DL (ref 0.8–1.3)
DIFFERENTIAL METHOD BLD: ABNORMAL
EOSINOPHIL # BLD: 0.03 K/UL (ref 0–0.8)
EOSINOPHIL NFR BLD: 0.2 % (ref 0.5–7.8)
ERYTHROCYTE [DISTWIDTH] IN BLOOD BY AUTOMATED COUNT: 12.2 % (ref 11.9–14.6)
GLOBULIN SER CALC-MCNC: 2.8 G/DL (ref 2.3–3.5)
GLUCOSE SERPL-MCNC: 112 MG/DL (ref 65–100)
HCT VFR BLD AUTO: 40.4 % (ref 41.1–50.3)
HGB BLD-MCNC: 14.1 G/DL (ref 13.6–17.2)
IMM GRANULOCYTES # BLD AUTO: 0.06 K/UL (ref 0–0.5)
IMM GRANULOCYTES NFR BLD AUTO: 0.5 % (ref 0–5)
LIPASE SERPL-CCNC: 25 U/L (ref 13–60)
LYMPHOCYTES # BLD: 1.17 K/UL (ref 0.5–4.6)
LYMPHOCYTES NFR BLD: 8.8 % (ref 13–44)
MAGNESIUM SERPL-MCNC: 2 MG/DL (ref 1.8–2.4)
MCH RBC QN AUTO: 33.3 PG (ref 26.1–32.9)
MCHC RBC AUTO-ENTMCNC: 34.9 G/DL (ref 31.4–35)
MCV RBC AUTO: 95.3 FL (ref 82–102)
MONOCYTES # BLD: 1.19 K/UL (ref 0.1–1.3)
MONOCYTES NFR BLD: 9 % (ref 4–12)
NEUTS SEG # BLD: 10.75 K/UL (ref 1.7–8.2)
NEUTS SEG NFR BLD: 81.3 % (ref 43–78)
NRBC # BLD: 0 K/UL (ref 0–0.2)
PLATELET # BLD AUTO: 322 K/UL (ref 150–450)
PMV BLD AUTO: 7.8 FL (ref 9.4–12.3)
POTASSIUM SERPL-SCNC: 4.2 MMOL/L (ref 3.5–5.1)
PROT SERPL-MCNC: 7.2 G/DL (ref 6.3–8.2)
RBC # BLD AUTO: 4.24 M/UL (ref 4.23–5.6)
SODIUM SERPL-SCNC: 134 MMOL/L (ref 133–143)
WBC # BLD AUTO: 13.2 K/UL (ref 4.3–11.1)

## 2025-06-12 PROCEDURE — 72125 CT NECK SPINE W/O DYE: CPT

## 2025-06-12 PROCEDURE — 70450 CT HEAD/BRAIN W/O DYE: CPT

## 2025-06-12 PROCEDURE — 80053 COMPREHEN METABOLIC PANEL: CPT

## 2025-06-12 PROCEDURE — 83735 ASSAY OF MAGNESIUM: CPT

## 2025-06-12 PROCEDURE — 93005 ELECTROCARDIOGRAM TRACING: CPT | Performed by: STUDENT IN AN ORGANIZED HEALTH CARE EDUCATION/TRAINING PROGRAM

## 2025-06-12 PROCEDURE — 2580000003 HC RX 258: Performed by: STUDENT IN AN ORGANIZED HEALTH CARE EDUCATION/TRAINING PROGRAM

## 2025-06-12 PROCEDURE — 6360000002 HC RX W HCPCS: Performed by: STUDENT IN AN ORGANIZED HEALTH CARE EDUCATION/TRAINING PROGRAM

## 2025-06-12 PROCEDURE — 96375 TX/PRO/DX INJ NEW DRUG ADDON: CPT

## 2025-06-12 PROCEDURE — 83690 ASSAY OF LIPASE: CPT

## 2025-06-12 PROCEDURE — 71260 CT THORAX DX C+: CPT

## 2025-06-12 PROCEDURE — 99285 EMERGENCY DEPT VISIT HI MDM: CPT

## 2025-06-12 PROCEDURE — 6360000004 HC RX CONTRAST MEDICATION: Performed by: STUDENT IN AN ORGANIZED HEALTH CARE EDUCATION/TRAINING PROGRAM

## 2025-06-12 PROCEDURE — 96374 THER/PROPH/DIAG INJ IV PUSH: CPT

## 2025-06-12 PROCEDURE — 85025 COMPLETE CBC W/AUTO DIFF WBC: CPT

## 2025-06-12 RX ORDER — HYDROCODONE BITARTRATE AND ACETAMINOPHEN 7.5; 325 MG/1; MG/1
1 TABLET ORAL EVERY 6 HOURS PRN
Qty: 12 TABLET | Refills: 0 | Status: SHIPPED | OUTPATIENT
Start: 2025-06-12 | End: 2025-06-15

## 2025-06-12 RX ORDER — DOCUSATE SODIUM 100 MG/1
100 CAPSULE, LIQUID FILLED ORAL 2 TIMES DAILY
Qty: 20 CAPSULE | Refills: 0 | Status: SHIPPED | OUTPATIENT
Start: 2025-06-12

## 2025-06-12 RX ORDER — IOPAMIDOL 755 MG/ML
100 INJECTION, SOLUTION INTRAVASCULAR
Status: COMPLETED | OUTPATIENT
Start: 2025-06-12 | End: 2025-06-12

## 2025-06-12 RX ORDER — ONDANSETRON 2 MG/ML
4 INJECTION INTRAMUSCULAR; INTRAVENOUS
Status: COMPLETED | OUTPATIENT
Start: 2025-06-12 | End: 2025-06-12

## 2025-06-12 RX ORDER — 0.9 % SODIUM CHLORIDE 0.9 %
1000 INTRAVENOUS SOLUTION INTRAVENOUS
Status: COMPLETED | OUTPATIENT
Start: 2025-06-12 | End: 2025-06-12

## 2025-06-12 RX ADMIN — FENTANYL CITRATE 100 MCG: 50 INJECTION INTRAMUSCULAR; INTRAVENOUS at 18:58

## 2025-06-12 RX ADMIN — IOPAMIDOL 100 ML: 755 INJECTION, SOLUTION INTRAVENOUS at 19:55

## 2025-06-12 RX ADMIN — SODIUM CHLORIDE 1000 ML: 0.9 INJECTION, SOLUTION INTRAVENOUS at 18:54

## 2025-06-12 RX ADMIN — ONDANSETRON 4 MG: 2 INJECTION, SOLUTION INTRAMUSCULAR; INTRAVENOUS at 18:57

## 2025-06-12 ASSESSMENT — PAIN - FUNCTIONAL ASSESSMENT
PAIN_FUNCTIONAL_ASSESSMENT: 0-10
PAIN_FUNCTIONAL_ASSESSMENT: 0-10

## 2025-06-12 ASSESSMENT — PAIN SCALES - GENERAL
PAINLEVEL_OUTOF10: 4
PAINLEVEL_OUTOF10: 10
PAINLEVEL_OUTOF10: 10

## 2025-06-12 ASSESSMENT — PAIN DESCRIPTION - ORIENTATION: ORIENTATION: LOWER

## 2025-06-12 ASSESSMENT — PAIN DESCRIPTION - LOCATION: LOCATION: BACK

## 2025-06-12 NOTE — ED NOTES
Pt was advised by medical staff  to remain flat for spinal precautions due to mechanical fall from ladder. Pt states \"I drove 90 mins home after and took a shower, I think if I was going to fuck up my back I already would have\" Pt was observed sitting up and repositioning self multiple times. Pt was reeducated again about the risk and importance of compliance. Cervical collar remains in place. MD made aware.

## 2025-06-12 NOTE — ED TRIAGE NOTES
Pt ambulatory to triage, states he fell off of an 8 foot ladder onto lower back, states he did hit head. Denies neck pain. Took norco 7.5 at about 1730 today, states he can barely lift his left leg due to pain. Denies LOC. Denies blood thinner use other than a low dose aspirin. States he did lose control of bladder after the fall. Pt is A&Ox4 in triage.

## 2025-06-12 NOTE — ED NOTES
Report given to CARLOS Damian. Pt was reeducated by on coming and off going nurse again of the importance of remained flat. Pt is still noncompliant despite frequent reeducation.

## 2025-06-12 NOTE — ED PROVIDER NOTES
4.0 - 12.0 %    Eosinophils % 0.2 (L) 0.5 - 7.8 %    Basophils % 0.2 0.0 - 2.0 %    Immature Granulocytes % 0.5 0.0 - 5.0 %    Neutrophils Absolute 10.75 (H) 1.70 - 8.20 K/UL    Lymphocytes Absolute 1.17 0.50 - 4.60 K/UL    Monocytes Absolute 1.19 0.10 - 1.30 K/UL    Eosinophils Absolute 0.03 0.00 - 0.80 K/UL    Basophils Absolute 0.03 0.00 - 0.20 K/UL    Immature Granulocytes Absolute 0.06 0.0 - 0.5 K/UL   Comprehensive Metabolic Panel   Result Value Ref Range    Sodium 134 133 - 143 mmol/L    Potassium 4.2 3.5 - 5.1 mmol/L    Chloride 96 (L) 98 - 107 mmol/L    CO2 29 20 - 29 mmol/L    Anion Gap 9 7 - 16 mmol/L    Glucose 112 (H) 65 - 100 mg/dL    BUN 8 8 - 23 MG/DL    Creatinine 0.69 (L) 0.80 - 1.30 MG/DL    Est, Glom Filt Rate >90 >60 ml/min/1.73m2    Calcium 9.6 8.8 - 10.2 MG/DL    Total Bilirubin 0.4 0.0 - 1.2 MG/DL    ALT 21 12 - 65 U/L    AST 27 15 - 37 U/L    Alk Phosphatase 79 40 - 129 U/L    Total Protein 7.2 6.3 - 8.2 g/dL    Albumin 4.4 3.2 - 4.6 g/dL    Globulin 2.8 2.3 - 3.5 g/dL    Albumin/Globulin Ratio 1.6 1.0 - 1.9     Lipase   Result Value Ref Range    Lipase 25 13 - 60 U/L   Magnesium   Result Value Ref Range    Magnesium 2.0 1.8 - 2.4 mg/dL   EKG 12 Lead   Result Value Ref Range    Ventricular Rate 85 BPM    Atrial Rate 83 BPM    P-R Interval 130 ms    QRS Duration 78 ms    Q-T Interval 384 ms    QTc Calculation (Bazett) 457 ms    P Axis 84 degrees    R Axis 78 degrees    T Axis 85 degrees    Diagnosis       Sinus rhythm  Right atrial enlargement  Probable anteroseptal infarct, old           CT CHEST ABDOMEN PELVIS W CONTRAST Additional Contrast? None   Final Result      1.  Acute comminuted compression fracture of the L4 vertebral body with anterior   displacement of the fracture fragments and approximately 40% superior height   loss.   2.  Redemonstrated infrarenal abdomen aortic aneurysm measuring 3.0 cm.   3.  Stable 6 mm hypodense right hepatic lesion which may represent hemangioma.   4.

## 2025-06-13 LAB
EKG ATRIAL RATE: 83 BPM
EKG DIAGNOSIS: NORMAL
EKG P AXIS: 84 DEGREES
EKG P-R INTERVAL: 130 MS
EKG Q-T INTERVAL: 384 MS
EKG QRS DURATION: 78 MS
EKG QTC CALCULATION (BAZETT): 457 MS
EKG R AXIS: 78 DEGREES
EKG T AXIS: 85 DEGREES
EKG VENTRICULAR RATE: 85 BPM

## 2025-06-13 PROCEDURE — 93010 ELECTROCARDIOGRAM REPORT: CPT | Performed by: INTERNAL MEDICINE

## 2025-06-13 NOTE — DISCHARGE INSTRUCTIONS
Use of brace as directed.  Take medication for pain as instructed.  Arrange follow-up with the spinal specialist listed.  Return for worsening symptoms, concerns or questions

## 2025-06-24 ENCOUNTER — OFFICE VISIT (OUTPATIENT)
Age: 60
End: 2025-06-24

## 2025-06-24 VITALS — HEIGHT: 66 IN | BODY MASS INDEX: 17.68 KG/M2 | WEIGHT: 110 LBS

## 2025-06-24 DIAGNOSIS — S32.040A COMPRESSION FRACTURE OF L4 VERTEBRA, INITIAL ENCOUNTER (HCC): Primary | ICD-10-CM

## 2025-06-24 PROCEDURE — 99204 OFFICE O/P NEW MOD 45 MIN: CPT | Performed by: ORTHOPAEDIC SURGERY

## 2025-06-24 NOTE — PROGRESS NOTES
Name: Remi Nayak  YOB: 1965  Gender: male  MRN: [id number]  Age: 60 y.o.    Chief Complaint:   Chief Complaint   Patient presents with    New Patient     Compression fracture L4      History of Present Illness  The patient is a 60-year-old male who presents for evaluation of a fracture of L4.    He experienced a fall from an 8-foot ladder at his workplace on 2025, resulting in significant back pain.  He presented to the emergency department where workup and CT scan revealed an L4 compression fracture.  Initially, he sought comfort in various sleeping positions, including a recliner and a hammock, but found these insufficiently firm.     He was advised by the emergency department to wear a brace as much as possible, which he did until the previous Wednesday.  He felt that the brace was actually worsening his pain and causing more radicular symptoms in his legs.  He removed the brace on Wednesday afternoon and has not worn it since, resulting in the resolution of his leg pain . He is seeking advice on whether to continue wearing the brace.    He feels like his pain is overall getting much better he finds temporary relief from self-massage and spends most of his day sitting. He was prescribed oxycodone but prefers not to take pills, opting instead for supplements and vitamins. He has been taking B1, B6, B12, calcium, and curcumin supplements. He believes he is healing and is attempting to remain seated and watch TV, although he finds this challenging because he wants to be busy. He is trying to avoid lifting anything heavier than 5 to 10 pounds at night.    SOCIAL HISTORY    Diet: Consumes supplements and vitamins, including B1, B6, B12, calcium, and curcumin               Social History:  Social History     Tobacco Use    Smoking status: Former     Current packs/day: 0.00     Types: Cigarettes     Quit date: 2023     Years since quittin.8    Smokeless tobacco: Never   Substance

## 2025-07-24 ENCOUNTER — OFFICE VISIT (OUTPATIENT)
Age: 60
End: 2025-07-24

## 2025-07-24 DIAGNOSIS — S32.040A COMPRESSION FRACTURE OF L4 VERTEBRA, INITIAL ENCOUNTER (HCC): Primary | ICD-10-CM

## 2025-07-24 PROCEDURE — 99214 OFFICE O/P EST MOD 30 MIN: CPT | Performed by: ORTHOPAEDIC SURGERY

## 2025-07-24 NOTE — PROGRESS NOTES
Name: Remi Nayak  YOB: 1965  Gender: male  MRN: [id number]  Age: 60 y.o.    Chief Complaint: L4 compression fracture    Assessment/Plan at Previous Visit: Conservative management    History of Present Illness  The patient is a 60-year-old male here today for follow-up of a previous compression fracture at L4. He was last seen on 06/24/2025. X-rays at that time showed anterior wedge compression deformity of L4.    He reports no significant change in his condition since the last visit.  He has no pain currently.  He expresses a desire to resume activities such as lawn mowing and cycling. Occasionally, he experiences a twinge of pain.    SOCIAL HISTORY  Exercise: Bicycle               Medications:       Current Outpatient Medications:     docusate sodium (COLACE) 100 MG capsule, Take 1 capsule by mouth 2 times daily, Disp: 20 capsule, Rfl: 0    ibuprofen (ADVIL;MOTRIN) 800 MG tablet, Take 1 tablet by mouth every 6 hours as needed for Pain (Patient not taking: Reported on 6/12/2025), Disp: 20 tablet, Rfl: 0    OXYGEN, Inhale into the lungs 2 LPM every night and PRN during day, Disp: , Rfl:     ondansetron (ZOFRAN) 4 MG tablet, Take 1 tablet by mouth 3 times daily as needed for Nausea or Vomiting (Patient not taking: Reported on 6/12/2025), Disp: 15 tablet, Rfl: 2    albuterol sulfate HFA (PROVENTIL;VENTOLIN;PROAIR) 108 (90 Base) MCG/ACT inhaler, Inhale 2 puffs into the lungs every 4 hours as needed for Wheezing (Patient not taking: Reported on 6/12/2025), Disp: 18 g, Rfl: 3    lisinopril (PRINIVIL;ZESTRIL) 20 MG tablet, Take 1 tablet by mouth daily (Patient not taking: Reported on 6/12/2025), Disp: 30 tablet, Rfl: 3    amLODIPine (NORVASC) 10 MG tablet, Take 1 tablet by mouth daily (Patient not taking: Reported on 6/12/2025), Disp: 30 tablet, Rfl: 3    guaiFENesin (MUCINEX) 600 MG extended release tablet, Take 1 tablet by mouth every 6 hours as needed for Congestion, Disp: 30 tablet, Rfl: 0